# Patient Record
Sex: MALE | Race: OTHER | HISPANIC OR LATINO | Employment: PART TIME | URBAN - METROPOLITAN AREA
[De-identification: names, ages, dates, MRNs, and addresses within clinical notes are randomized per-mention and may not be internally consistent; named-entity substitution may affect disease eponyms.]

---

## 2022-08-03 ENCOUNTER — HOSPITAL ENCOUNTER (OUTPATIENT)
Dept: RADIOLOGY | Facility: HOSPITAL | Age: 47
Discharge: HOME | End: 2022-08-03
Attending: PHYSICAL MEDICINE & REHABILITATION
Payer: COMMERCIAL

## 2022-08-03 DIAGNOSIS — M48.061 SPINAL STENOSIS, LUMBAR REGION WITHOUT NEUROGENIC CLAUDICATION: ICD-10-CM

## 2022-08-03 DIAGNOSIS — M53.3 SACROCOCCYGEAL DISORDERS, NOT ELSEWHERE CLASSIFIED: ICD-10-CM

## 2022-08-03 PROCEDURE — 72110 X-RAY EXAM L-2 SPINE 4/>VWS: CPT

## 2022-08-03 PROCEDURE — 72202 X-RAY EXAM SI JOINTS 3/> VWS: CPT

## 2022-08-04 ENCOUNTER — HOSPITAL ENCOUNTER (OUTPATIENT)
Dept: RADIOLOGY | Facility: HOSPITAL | Age: 47
Discharge: HOME | End: 2022-08-04
Attending: PHYSICAL MEDICINE & REHABILITATION
Payer: COMMERCIAL

## 2022-08-04 DIAGNOSIS — M22.41 CHONDROMALACIA OF RIGHT PATELLA: ICD-10-CM

## 2022-08-04 PROCEDURE — 73562 X-RAY EXAM OF KNEE 3: CPT

## 2023-03-18 ENCOUNTER — HOSPITAL ENCOUNTER (EMERGENCY)
Facility: HOSPITAL | Age: 48
Discharge: HOME | End: 2023-03-18
Attending: INTERNAL MEDICINE

## 2023-03-18 VITALS
SYSTOLIC BLOOD PRESSURE: 128 MMHG | TEMPERATURE: 97.5 F | DIASTOLIC BLOOD PRESSURE: 71 MMHG | WEIGHT: 155.2 LBS | HEART RATE: 86 BPM | OXYGEN SATURATION: 99 % | RESPIRATION RATE: 16 BRPM

## 2023-03-18 DIAGNOSIS — H66.90 OTITIS MEDIA: Primary | ICD-10-CM

## 2023-03-18 DIAGNOSIS — H60.90 OTITIS EXTERNA: ICD-10-CM

## 2023-03-18 DIAGNOSIS — J32.9 SINUSITIS: ICD-10-CM

## 2023-03-18 DIAGNOSIS — N39.0 UTI (URINARY TRACT INFECTION): ICD-10-CM

## 2023-03-18 LAB
BACTERIA UR QL AUTO: ABNORMAL /HPF
BILIRUB UR QL STRIP: NEGATIVE
CLARITY UR: CLEAR
COLOR UR: ABNORMAL
GLUCOSE UR STRIP-MCNC: NEGATIVE MG/DL
HGB UR QL STRIP.AUTO: 25
KETONES UR STRIP-MCNC: NEGATIVE MG/DL
LEUKOCYTE ESTERASE UR QL STRIP: 25
MUCOUS THREADS UR QL AUTO: ABNORMAL
NITRITE UR QL STRIP: NEGATIVE
NON-SQ EPI CELLS URNS QL MICRO: ABNORMAL /HPF
PH UR STRIP.AUTO: 6 [PH]
PROT UR STRIP-MCNC: NEGATIVE MG/DL
RBC #/AREA URNS AUTO: ABNORMAL /HPF
SP GR UR STRIP.AUTO: 1.02 (ref 1–1.04)
UROBILINOGEN UA: NEGATIVE MG/DL
WBC #/AREA URNS AUTO: ABNORMAL /HPF

## 2023-03-18 RX ORDER — CEPHALEXIN 500 MG/1
500 CAPSULE ORAL EVERY 8 HOURS SCHEDULED
Qty: 30 CAPSULE | Refills: 0 | Status: SHIPPED | OUTPATIENT
Start: 2023-03-18 | End: 2023-03-28

## 2023-03-18 RX ORDER — CEPHALEXIN 500 MG/1
500 CAPSULE ORAL ONCE
Status: COMPLETED | OUTPATIENT
Start: 2023-03-18 | End: 2023-03-18

## 2023-03-18 RX ORDER — NEOMYCIN SULFATE, POLYMYXIN B SULFATE AND HYDROCORTISONE 10; 3.5; 1 MG/ML; MG/ML; [USP'U]/ML
4 SUSPENSION/ DROPS AURICULAR (OTIC) 4 TIMES DAILY
Qty: 10 ML | Refills: 0 | Status: SHIPPED | OUTPATIENT
Start: 2023-03-18

## 2023-03-18 RX ADMIN — CEPHALEXIN 500 MG: 500 CAPSULE ORAL at 10:57

## 2023-03-18 NOTE — ED PROVIDER NOTES
History  Chief Complaint   Patient presents with   • Earache     Bilat ear pain with right worse than left   • Painful Urination     Burning with urination along with foul odor  States he has had the same in the past     Pt with bilat ear pain and and nasal d/c  Pt also with burning with urine for several days, pt say urine smells bad, no abd pain no nausea no vomiting      Earache  Location:  Bilateral  Behind ear:  No abnormality  Quality:  Aching  Severity:  Moderate  Onset quality:  Gradual  Duration:  2 days  Progression:  Unchanged  Chronicity:  Recurrent  Context: not direct blow    Relieved by:  Nothing  Worsened by:  Nothing  Ineffective treatments:  None tried  Associated symptoms: congestion    Risk factors: no recent travel, no chronic ear infection and no prior ear surgery        None       Past Medical History:   Diagnosis Date   • Anxiety    • Depression        Past Surgical History:   Procedure Laterality Date   • EAR SURGERY Right    • EYE SURGERY Right    • PENIS SURGERY         History reviewed  No pertinent family history  I have reviewed and agree with the history as documented  E-Cigarette/Vaping   • E-Cigarette Use Never User      E-Cigarette/Vaping Substances     Social History     Tobacco Use   • Smoking status: Every Day     Packs/day: 1 00     Types: Cigarettes   • Smokeless tobacco: Never   Vaping Use   • Vaping Use: Never used   Substance Use Topics   • Alcohol use: Never   • Drug use: Yes     Types: Marijuana       Review of Systems   Constitutional: Negative  HENT: Positive for congestion, ear pain, sinus pressure and sinus pain  Eyes: Negative  Respiratory: Negative  Cardiovascular: Negative  Gastrointestinal: Negative  Endocrine: Negative  Genitourinary: Positive for dysuria  Musculoskeletal: Negative  Skin: Negative  Allergic/Immunologic: Negative  Neurological: Negative  Hematological: Negative  Psychiatric/Behavioral: Negative      All other systems reviewed and are negative  Physical Exam  Physical Exam  Vitals and nursing note reviewed  Constitutional:       Appearance: Normal appearance  He is normal weight  HENT:      Head: Normocephalic and atraumatic  Right Ear: External ear normal       Left Ear: Ear canal and external ear normal       Ears:      Comments: bilat tm erythema  Right ear canal erythema, no mastoid pain        Nose: Congestion and rhinorrhea present  Comments: Boggy mucosa left max sinus tender to palp      Mouth/Throat:      Mouth: Mucous membranes are moist       Pharynx: Oropharynx is clear  Eyes:      Extraocular Movements: Extraocular movements intact  Conjunctiva/sclera: Conjunctivae normal       Pupils: Pupils are equal, round, and reactive to light  Cardiovascular:      Rate and Rhythm: Normal rate and regular rhythm  Pulses: Normal pulses  Heart sounds: Normal heart sounds  Pulmonary:      Effort: Pulmonary effort is normal       Breath sounds: Normal breath sounds  Abdominal:      General: Abdomen is flat  Bowel sounds are normal       Palpations: Abdomen is soft  Musculoskeletal:         General: Normal range of motion  Cervical back: Normal range of motion and neck supple  Skin:     General: Skin is warm  Capillary Refill: Capillary refill takes less than 2 seconds  Neurological:      General: No focal deficit present  Mental Status: He is alert and oriented to person, place, and time     Psychiatric:         Mood and Affect: Mood normal          Vital Signs  ED Triage Vitals [03/18/23 0942]   Temperature Pulse Respirations Blood Pressure SpO2   97 5 °F (36 4 °C) 86 16 128/71 99 %      Temp Source Heart Rate Source Patient Position - Orthostatic VS BP Location FiO2 (%)   Oral Monitor Sitting Left arm --      Pain Score       --           Vitals:    03/18/23 0942   BP: 128/71   Pulse: 86   Patient Position - Orthostatic VS: Sitting         Visual Acuity      ED Medications  Medications   cephalexin (KEFLEX) capsule 500 mg (500 mg Oral Given 3/18/23 1057)       Diagnostic Studies  Results Reviewed     Procedure Component Value Units Date/Time    Urine Microscopic [[id number]]  (Abnormal) Collected: 03/18/23 1001    Lab Status: Final result Specimen: Urine, Clean Catch Updated: 03/18/23 1025     RBC, UA 2-4 /hpf      WBC, UA 0-5 /hpf      Epithelial Cells Occasional /hpf      Bacteria, UA Occasional /hpf      MUCUS THREADS Occasional    UA w Reflex to Microscopic w Reflex to Culture [[id number]]  (Abnormal) Collected: 03/18/23 1001    Lab Status: Final result Specimen: Urine, Clean Catch Updated: 03/18/23 1014     Color, UA Danyell     Clarity, UA Clear     Specific [place], UA 1 020     pH, UA 6 0     Leukocytes, UA 25 0     Nitrite, UA Negative     Protein, UA Negative mg/dl      Glucose, UA Negative mg/dl      Ketones, UA Negative mg/dl      Bilirubin, UA Negative     Occult Blood, UA 25 0     UROBILINOGEN UA Negative mg/dL     Chlamydia/GC amplified DNA by PCR [[id number]] Collected: 03/18/23 1007    Lab Status: In process Specimen: Urine, Other Updated: 03/18/23 1008                 No orders to display              Procedures  Procedures         ED Course                                             MDM    Disposition  Final diagnoses:   Otitis media   Sinusitis   UTI (urinary tract infection)   Otitis externa     Time reflects when diagnosis was documented in both MDM as applicable and the Disposition within this note     Time User Action Codes Description Comment    3/18/2023 10:46 AM Terell New  Add [H66 90] Otitis media     3/18/2023 10:46 AM Nilsa Hoang  Add [J32 9] Sinusitis     3/18/2023 10:46 AM Paolo Henry  Add [N39 0] UTI (urinary tract infection)     3/18/2023 10:48 AM Nilsa Hoang   Add [H60 90] Otitis externa       ED Disposition     ED Disposition   Discharge    Condition   Stable    Date/Time   Sat Mar 18, 2023 10:46 AM Comment   Marden Shoulder discharge to home/self care  Follow-up Information     Follow up With Specialties Details Why [address] [place] [address]  [phone]            Discharge Medication List as of 3/18/2023 10:49 AM      START taking these medications    Details   cephalexin (KEFLEX) 500 mg capsule Take 1 capsule (500 mg total) by mouth every 8 (eight) hours for 10 days, Starting Sat 3/18/2023, Until Tue 3/28/2023, Print      neomycin-polymyxin-hydrocortisone (CORTISPORIN) 0 35%-10,000 units/mL-1% otic suspension Administer 4 drops to the right ear 4 (four) times a day, Starting Sat 3/18/2023, Print             No discharge procedures on file      PDMP Review     None          ED Provider  Electronically Signed by           Coy Anders PA-C  03/18/23 0120

## 2023-03-20 LAB
C TRACH DNA SPEC QL NAA+PROBE: NEGATIVE
N GONORRHOEA DNA SPEC QL NAA+PROBE: NEGATIVE

## 2023-08-08 ENCOUNTER — HOSPITAL ENCOUNTER (EMERGENCY)
Facility: HOSPITAL | Age: 48
Discharge: HOME | End: 2023-08-09
Attending: EMERGENCY MEDICINE
Payer: COMMERCIAL

## 2023-08-08 ENCOUNTER — APPOINTMENT (EMERGENCY)
Dept: RADIOLOGY | Facility: HOSPITAL | Age: 48
End: 2023-08-08
Payer: COMMERCIAL

## 2023-08-08 VITALS
HEART RATE: 95 BPM | SYSTOLIC BLOOD PRESSURE: 138 MMHG | WEIGHT: 141.31 LBS | TEMPERATURE: 97.8 F | OXYGEN SATURATION: 99 % | RESPIRATION RATE: 18 BRPM | DIASTOLIC BLOOD PRESSURE: 91 MMHG

## 2023-08-08 DIAGNOSIS — S60.221A CONTUSION OF RIGHT HAND, INITIAL ENCOUNTER: Primary | ICD-10-CM

## 2023-08-08 PROCEDURE — 73110 X-RAY EXAM OF WRIST: CPT

## 2023-08-08 PROCEDURE — 73130 X-RAY EXAM OF HAND: CPT

## 2023-08-08 PROCEDURE — 99283 EMERGENCY DEPT VISIT LOW MDM: CPT

## 2023-08-08 PROCEDURE — 99284 EMERGENCY DEPT VISIT MOD MDM: CPT | Performed by: PHYSICIAN ASSISTANT

## 2023-08-08 NOTE — Clinical Note
Faith Orourke was seen and treated in our emergency department on 8/8/2023. No restrictions            Diagnosis:     Randy Lopez  may return to work on return date. He may return on this date: 08/13/2023         If you have any questions or concerns, please don't hesitate to call.       Bijan Stevenson PA-C    ______________________________           _______________          _______________  Hospital Representative                              Date                                Time

## 2023-08-09 RX ORDER — NAPROXEN 500 MG/1
500 TABLET ORAL 2 TIMES DAILY WITH MEALS
Qty: 30 TABLET | Refills: 0 | Status: SHIPPED | OUTPATIENT
Start: 2023-08-09 | End: 2023-08-17 | Stop reason: SDUPTHER

## 2023-08-09 RX ORDER — ACETAMINOPHEN 500 MG
500 TABLET ORAL EVERY 6 HOURS PRN
Qty: 30 TABLET | Refills: 0 | Status: SHIPPED | OUTPATIENT
Start: 2023-08-09
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
## 2023-08-11 ENCOUNTER — HOSPITAL ENCOUNTER (EMERGENCY)
Facility: HOSPITAL | Age: 48
Discharge: HOME | End: 2023-08-11
Attending: EMERGENCY MEDICINE | Admitting: EMERGENCY MEDICINE
Payer: COMMERCIAL

## 2023-08-11 ENCOUNTER — APPOINTMENT (EMERGENCY)
Dept: RADIOLOGY | Facility: HOSPITAL | Age: 48
End: 2023-08-11
Payer: COMMERCIAL

## 2023-08-11 VITALS
OXYGEN SATURATION: 98 % | WEIGHT: 141.54 LBS | HEART RATE: 84 BPM | RESPIRATION RATE: 17 BRPM | DIASTOLIC BLOOD PRESSURE: 68 MMHG | SYSTOLIC BLOOD PRESSURE: 134 MMHG | TEMPERATURE: 98.2 F

## 2023-08-11 DIAGNOSIS — M79.646 PAIN OF FINGER: ICD-10-CM

## 2023-08-11 DIAGNOSIS — M79.641 RIGHT HAND PAIN: Primary | ICD-10-CM

## 2023-08-11 PROCEDURE — 96372 THER/PROPH/DIAG INJ SC/IM: CPT

## 2023-08-11 PROCEDURE — 73140 X-RAY EXAM OF FINGER(S): CPT

## 2023-08-11 PROCEDURE — 99284 EMERGENCY DEPT VISIT MOD MDM: CPT | Performed by: PHYSICIAN ASSISTANT

## 2023-08-11 PROCEDURE — NC001 PR NO CHARGE: Performed by: PHYSICIAN ASSISTANT

## 2023-08-11 PROCEDURE — 73130 X-RAY EXAM OF HAND: CPT

## 2023-08-11 PROCEDURE — 99283 EMERGENCY DEPT VISIT LOW MDM: CPT

## 2023-08-11 PROCEDURE — 29125 APPL SHORT ARM SPLINT STATIC: CPT | Performed by: PHYSICIAN ASSISTANT

## 2023-08-11 RX ORDER — KETOROLAC TROMETHAMINE 30 MG/ML
15 INJECTION, SOLUTION INTRAMUSCULAR; INTRAVENOUS ONCE
Status: COMPLETED | OUTPATIENT
Start: 2023-08-11 | End: 2023-08-11

## 2023-08-11 RX ORDER — NAPROXEN 500 MG/1
500 TABLET ORAL 2 TIMES DAILY PRN
Qty: 20 TABLET | Refills: 0 | Status: SHIPPED | OUTPATIENT
Start: 2023-08-11

## 2023-08-11 RX ADMIN — KETOROLAC TROMETHAMINE 15 MG: 30 INJECTION, SOLUTION INTRAMUSCULAR; INTRAVENOUS at 13:32
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
## 2023-08-11 NOTE — Clinical Note
Brandon Robles was seen and treated in our emergency department on 8/11/2023. Diagnosis:     Adiel Appl  . He may return on this date: May return to work when cleared by hand specialist or by primary care physician/occupational medicine. If you have any questions or concerns, please don't hesitate to call.       Josephina Fabry, PA-C    ______________________________           _______________          _______________  Hospital Representative                              Date                                Time

## 2023-08-11 NOTE — DISCHARGE INSTRUCTIONS
Please refer to the attached information for strict return instructions. If symptoms worsen or new symptoms develop please return to the ER. Please follow up with hand specialist for re-evaluation of symptoms.

## 2023-08-11 NOTE — Clinical Note
Lisa Juarez was seen and treated in our emergency department on 8/11/2023. Diagnosis:     Nargis Julien  . He may return on this date: May return to work when cleared by hand specialist or by primary care physician/occupational medicine. If you have any questions or concerns, please don't hesitate to call.       Atif Whitaker PA-C    ______________________________           _______________          _______________  Hospital Representative                              Date                                Time

## 2023-08-14 ENCOUNTER — TELEPHONE (OUTPATIENT)
Age: 48
End: 2023-08-14

## 2023-08-14 NOTE — TELEPHONE ENCOUNTER
Patient is being referred to a orthopedics. Please schedule accordingly.     [address]   [phone]

## 2023-08-17 ENCOUNTER — OFFICE VISIT (OUTPATIENT)
Dept: OBGYN CLINIC | Facility: CLINIC | Age: 48
End: 2023-08-17
Payer: COMMERCIAL

## 2023-08-17 VITALS
HEIGHT: 66 IN | WEIGHT: 141 LBS | BODY MASS INDEX: 22.66 KG/M2 | DIASTOLIC BLOOD PRESSURE: 70 MMHG | SYSTOLIC BLOOD PRESSURE: 120 MMHG

## 2023-08-17 DIAGNOSIS — S60.221A CONTUSION OF RIGHT HAND, INITIAL ENCOUNTER: ICD-10-CM

## 2023-08-17 DIAGNOSIS — S60.221A CONTUSION OF RIGHT HAND, INITIAL ENCOUNTER: Primary | ICD-10-CM

## 2023-08-17 PROCEDURE — 99203 OFFICE O/P NEW LOW 30 MIN: CPT | Performed by: PHYSICIAN ASSISTANT

## 2023-08-17 RX ORDER — MELOXICAM 15 MG/1
15 TABLET ORAL DAILY
Qty: 30 TABLET | Refills: 0 | Status: SHIPPED | OUTPATIENT
Start: 2023-08-17 | End: 2023-08-17

## 2023-08-17 RX ORDER — MELOXICAM 15 MG/1
15 TABLET ORAL DAILY
Qty: 30 TABLET | Refills: 0 | Status: SHIPPED | OUTPATIENT
Start: 2023-08-17

## 2023-08-17 RX ORDER — NAPROXEN 500 MG/1
500 TABLET ORAL 2 TIMES DAILY WITH MEALS
Qty: 60 TABLET | Refills: 0 | Status: SHIPPED | OUTPATIENT
Start: 2023-08-17

## 2023-08-17 RX ORDER — GABAPENTIN 800 MG/1
800 TABLET ORAL 3 TIMES DAILY
COMMUNITY
Start: 2023-07-10
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
## 2023-08-17 NOTE — LETTER
August 17, 2023     Patient: Retta Curling  YOB: 1975  Date of Visit: 8/17/2023      To Whom it May Concern:    Retta Curling is under my professional care. Nahomi Adair was seen in my office on 8/17/2023. Nahomi Adair is out of work until his next evaluation in four weeks. If you have any questions or concerns, please don't hesitate to call.          Sincerely,          Fabian Davison PA-C

## 2023-12-11 ENCOUNTER — HOSPITAL ENCOUNTER (EMERGENCY)
Facility: HOSPITAL | Age: 48
Discharge: HOME | End: 2023-12-11
Attending: INTERNAL MEDICINE
Payer: COMMERCIAL

## 2023-12-11 VITALS
DIASTOLIC BLOOD PRESSURE: 59 MMHG | OXYGEN SATURATION: 96 % | TEMPERATURE: 97.7 F | RESPIRATION RATE: 16 BRPM | BODY MASS INDEX: 25.92 KG/M2 | HEART RATE: 79 BPM | WEIGHT: 160.6 LBS | SYSTOLIC BLOOD PRESSURE: 106 MMHG

## 2023-12-11 DIAGNOSIS — B34.9 VIRAL SYNDROME: Primary | ICD-10-CM

## 2023-12-11 PROCEDURE — 99282 EMERGENCY DEPT VISIT SF MDM: CPT

## 2023-12-11 PROCEDURE — 99283 EMERGENCY DEPT VISIT LOW MDM: CPT | Performed by: INTERNAL MEDICINE

## 2023-12-11 RX ORDER — ONDANSETRON 4 MG/1
4 TABLET, ORALLY DISINTEGRATING ORAL EVERY 6 HOURS PRN
Qty: 20 TABLET | Refills: 0 | Status: SHIPPED | OUTPATIENT
Start: 2023-12-11

## 2023-12-11 RX ORDER — NAPROXEN 500 MG/1
500 TABLET ORAL 2 TIMES DAILY WITH MEALS
Qty: 30 TABLET | Refills: 0 | Status: SHIPPED | OUTPATIENT
Start: 2023-12-11

## 2023-12-11 RX ORDER — BENZONATATE 100 MG/1
100 CAPSULE ORAL EVERY 8 HOURS
Qty: 21 CAPSULE | Refills: 0 | Status: SHIPPED | OUTPATIENT
Start: 2023-12-11

## 2023-12-11 RX ORDER — FLUTICASONE PROPIONATE 50 MCG
1 SPRAY, SUSPENSION (ML) NASAL DAILY
Qty: 16 G | Refills: 0 | Status: SHIPPED | OUTPATIENT
Start: 2023-12-11

## 2023-12-11 NOTE — ED PROVIDER NOTES
HPI: Patient is a 48 y.o. male who presents with 3 days of congestion, fever, chills, cough, headache, sore throat, fatigue, myalgias, and nausea which the patient describes at moderate The patient has had contact with people with similar symptoms.  The patient has not taken any medication.    Allergies   Allergen Reactions    Sulfa Antibiotics Itching    Morphine Rash       Past Medical History:   Diagnosis Date    Anxiety     Depression       Past Surgical History:   Procedure Laterality Date    EAR SURGERY Right     EYE SURGERY Right     PENIS SURGERY       Social History     Tobacco Use    Smoking status: Every Day     Packs/day: 1.00     Types: Cigarettes    Smokeless tobacco: Never   Vaping Use    Vaping Use: Never used   Substance Use Topics    Alcohol use: Never    Drug use: Yes     Types: Marijuana       Nursing notes reviewed  Physical Exam:  ED Triage Vitals [12/11/23 0928]   Temperature Pulse Respirations Blood Pressure SpO2   97.7 °F (36.5 °C) 79 16 106/59 96 %      Temp Source Heart Rate Source Patient Position - Orthostatic VS BP Location FiO2 (%)   Tympanic Monitor Sitting Left arm --      Pain Score       --           ROS: Positive for congestion fever, chills, cough, headache, sore throat, fatigue, myalgias, and nausea , the remainder of a 10 organ system ROS was otherwise unremarkable.  PHYSICAL EXAM    Constitutional:  Well developed, no acute distress  HEENT:  Conjunctiva normal. Oropharynx moist, no posterior oropharyngeal erythema or edema, no tonsillar exudate  Respiratory:  No respiratory distress, clear to auscultation  Cardiovascular:  Normal rate  GI:  Soft, nondistended, nontender  :  No costovertebral angle tenderness   Musculoskeletal:  No edema, no tenderness, no deformities  Integument:  Well hydrated, no rash   Lymphatic:  No lymphadenopathy noted   Neurologic:  Alert & oriented x 3, normal motor function, no focal deficits noted   Psychiatric:  Speech and behavior appropriate        The patient is stable and has a history and physical exam consistent with a viral illness. COVID19 testing has not been performed.  I considered the patient's other medical conditions as applicable/noted above in my medical decision making.  The patient is stable upon discharge. The plan is for supportive care at home.    The patient (and any family present) verbalized understanding of the discharge instructions and warnings that would necessitate return to the Emergency Department.  All questions were answered prior to discharge.    Medications - No data to display  Final diagnoses:   Viral syndrome     Time reflects when diagnosis was documented in both MDM as applicable and the Disposition within this note       Time User Action Codes Description Comment    12/11/2023  9:41 AM Anjelica Dunn [B34.9] Viral syndrome           ED Disposition       ED Disposition   Discharge    Condition   Stable    Date/Time   Mon Dec 11, 2023  9:41 AM    Comment   Chaitanya Alexander discharge to home/self care.                   Follow-up Information    None       Patient's Medications   Discharge Prescriptions    BENZONATATE (TESSALON PERLES) 100 MG CAPSULE    Take 1 capsule (100 mg total) by mouth every 8 (eight) hours       Start Date: 12/11/2023End Date: --       Order Dose: 100 mg       Quantity: 21 capsule    Refills: 0    FLUTICASONE (FLONASE) 50 MCG/ACT NASAL SPRAY    1 spray into each nostril daily       Start Date: 12/11/2023End Date: --       Order Dose: 1 spray       Quantity: 16 g    Refills: 0    NAPROXEN (NAPROSYN) 500 MG TABLET    Take 1 tablet (500 mg total) by mouth 2 (two) times a day with meals       Start Date: 12/11/2023End Date: --       Order Dose: 500 mg       Quantity: 30 tablet    Refills: 0    ONDANSETRON (ZOFRAN-ODT) 4 MG DISINTEGRATING TABLET    Take 1 tablet (4 mg total) by mouth every 6 (six) hours as needed for nausea or vomiting       Start Date: 12/11/2023End Date: --       Order Dose: 4 mg        Quantity: 20 tablet    Refills: 0     No discharge procedures on file.    Electronically Signed by       Anjelica Dunn MD  12/11/23 0922

## 2023-12-11 NOTE — Clinical Note
Chaitanya Alexander was seen and treated in our emergency department on 12/11/2023.                Diagnosis:     Chaitanya  .    He may return on this date: 12/14/2023         If you have any questions or concerns, please don't hesitate to call.      Anjelica Dunn MD    ______________________________           _______________          _______________  Hospital Representative                              Date                                Time

## 2023-12-25 ENCOUNTER — HOSPITAL ENCOUNTER (EMERGENCY)
Facility: HOSPITAL | Age: 48
Discharge: HOME | End: 2023-12-25
Attending: EMERGENCY MEDICINE
Payer: COMMERCIAL

## 2023-12-25 ENCOUNTER — APPOINTMENT (EMERGENCY)
Dept: CT IMAGING | Facility: HOSPITAL | Age: 48
End: 2023-12-25
Payer: COMMERCIAL

## 2023-12-25 VITALS
DIASTOLIC BLOOD PRESSURE: 74 MMHG | RESPIRATION RATE: 18 BRPM | BODY MASS INDEX: 23.84 KG/M2 | TEMPERATURE: 98 F | OXYGEN SATURATION: 98 % | WEIGHT: 147.71 LBS | HEART RATE: 81 BPM | SYSTOLIC BLOOD PRESSURE: 124 MMHG

## 2023-12-25 DIAGNOSIS — R06.6 HICCUPS: ICD-10-CM

## 2023-12-25 DIAGNOSIS — R10.9 ABDOMINAL PAIN: Primary | ICD-10-CM

## 2023-12-25 DIAGNOSIS — B02.9 SHINGLES: ICD-10-CM

## 2023-12-25 DIAGNOSIS — K44.9 HIATAL HERNIA: ICD-10-CM

## 2023-12-25 DIAGNOSIS — K21.9 GERD (GASTROESOPHAGEAL REFLUX DISEASE): ICD-10-CM

## 2023-12-25 LAB
ALBUMIN SERPL BCP-MCNC: 3.9 G/DL (ref 3.5–5)
ALP SERPL-CCNC: 61 U/L (ref 34–104)
ALT SERPL W P-5'-P-CCNC: 9 U/L (ref 7–52)
ANION GAP SERPL CALCULATED.3IONS-SCNC: 5 MMOL/L
ANISOCYTOSIS BLD QL SMEAR: PRESENT
AST SERPL W P-5'-P-CCNC: 13 U/L (ref 13–39)
BASOPHILS # BLD MANUAL: 0 THOUSAND/UL (ref 0–0.1)
BASOPHILS NFR MAR MANUAL: 0 % (ref 0–1)
BILIRUB SERPL-MCNC: 0.51 MG/DL (ref 0.2–1)
BUN SERPL-MCNC: 13 MG/DL (ref 5–25)
CALCIUM SERPL-MCNC: 8.6 MG/DL (ref 8.4–10.2)
CHLORIDE SERPL-SCNC: 106 MMOL/L (ref 96–108)
CO2 SERPL-SCNC: 27 MMOL/L (ref 21–32)
CREAT SERPL-MCNC: 1.05 MG/DL (ref 0.6–1.3)
DIFFERENTIAL COMMENT: ABNORMAL
EOSINOPHIL # BLD MANUAL: 0 THOUSAND/UL (ref 0–0.4)
EOSINOPHIL NFR BLD MANUAL: 0 % (ref 0–6)
ERYTHROCYTE [DISTWIDTH] IN BLOOD BY AUTOMATED COUNT: 12.6 % (ref 11.6–15.1)
GFR SERPL CREATININE-BSD FRML MDRD: 82 ML/MIN/1.73SQ M
GLUCOSE SERPL-MCNC: 98 MG/DL (ref 65–140)
HCT VFR BLD AUTO: 40.2 % (ref 36.5–49.3)
HGB BLD-MCNC: 13.8 G/DL (ref 12–17)
LG PLATELETS BLD QL SMEAR: PRESENT
LIPASE SERPL-CCNC: 39 U/L (ref 11–82)
LYMPHOCYTES # BLD AUTO: 1.77 THOUSAND/UL (ref 0.6–4.47)
LYMPHOCYTES # BLD AUTO: 8 % (ref 14–44)
MACROCYTES BLD QL AUTO: PRESENT
MCH RBC QN AUTO: 29.7 PG (ref 26.8–34.3)
MCHC RBC AUTO-ENTMCNC: 34.3 G/DL (ref 31.4–37.4)
MCV RBC AUTO: 87 FL (ref 82–98)
MICROCYTES BLD QL AUTO: PRESENT
MONOCYTES # BLD AUTO: 0.59 THOUSAND/UL (ref 0–1.22)
MONOCYTES NFR BLD: 7 % (ref 4–12)
NEUTROPHILS # BLD MANUAL: 6.07 THOUSAND/UL (ref 1.85–7.62)
NEUTS BAND NFR BLD MANUAL: 3 % (ref 0–8)
NEUTS SEG NFR BLD AUTO: 69 % (ref 43–75)
OVALOCYTES BLD QL SMEAR: PRESENT
PATHOLOGY REVIEW: YES
PLATELET # BLD AUTO: 265 THOUSANDS/UL (ref 149–390)
PLATELET BLD QL SMEAR: ADEQUATE
PMV BLD AUTO: 9.4 FL (ref 8.9–12.7)
POIKILOCYTOSIS BLD QL SMEAR: PRESENT
POTASSIUM SERPL-SCNC: 3.9 MMOL/L (ref 3.5–5.3)
PROT SERPL-MCNC: 6.2 G/DL (ref 6.4–8.4)
RBC # BLD AUTO: 4.65 MILLION/UL (ref 3.88–5.62)
RBC MORPH BLD: PRESENT
SODIUM SERPL-SCNC: 138 MMOL/L (ref 135–147)
TOXIC GRANULES BLD QL SMEAR: PRESENT
VARIANT LYMPHS # BLD AUTO: 13 %
WBC # BLD AUTO: 8.43 THOUSAND/UL (ref 4.31–10.16)

## 2023-12-25 PROCEDURE — 74177 CT ABD & PELVIS W/CONTRAST: CPT

## 2023-12-25 PROCEDURE — 93005 ELECTROCARDIOGRAM TRACING: CPT

## 2023-12-25 PROCEDURE — 96375 TX/PRO/DX INJ NEW DRUG ADDON: CPT

## 2023-12-25 PROCEDURE — 96372 THER/PROPH/DIAG INJ SC/IM: CPT

## 2023-12-25 PROCEDURE — 99285 EMERGENCY DEPT VISIT HI MDM: CPT | Performed by: EMERGENCY MEDICINE

## 2023-12-25 PROCEDURE — 85007 BL SMEAR W/DIFF WBC COUNT: CPT | Performed by: EMERGENCY MEDICINE

## 2023-12-25 PROCEDURE — 85027 COMPLETE CBC AUTOMATED: CPT | Performed by: EMERGENCY MEDICINE

## 2023-12-25 PROCEDURE — 96374 THER/PROPH/DIAG INJ IV PUSH: CPT

## 2023-12-25 PROCEDURE — G1004 CDSM NDSC: HCPCS

## 2023-12-25 PROCEDURE — 80053 COMPREHEN METABOLIC PANEL: CPT | Performed by: EMERGENCY MEDICINE

## 2023-12-25 PROCEDURE — 99284 EMERGENCY DEPT VISIT MOD MDM: CPT

## 2023-12-25 PROCEDURE — 83690 ASSAY OF LIPASE: CPT | Performed by: EMERGENCY MEDICINE

## 2023-12-25 PROCEDURE — C9113 INJ PANTOPRAZOLE SODIUM, VIA: HCPCS | Performed by: EMERGENCY MEDICINE

## 2023-12-25 PROCEDURE — 36415 COLL VENOUS BLD VENIPUNCTURE: CPT | Performed by: EMERGENCY MEDICINE

## 2023-12-25 RX ORDER — ACYCLOVIR 800 MG/1
800 TABLET ORAL 4 TIMES DAILY
Qty: 20 TABLET | Refills: 0 | Status: SHIPPED | OUTPATIENT
Start: 2023-12-25 | End: 2023-12-30

## 2023-12-25 RX ORDER — ONDANSETRON 2 MG/ML
4 INJECTION INTRAMUSCULAR; INTRAVENOUS ONCE
Status: COMPLETED | OUTPATIENT
Start: 2023-12-25 | End: 2023-12-25

## 2023-12-25 RX ORDER — PANTOPRAZOLE SODIUM 40 MG/1
40 TABLET, DELAYED RELEASE ORAL DAILY
Qty: 30 TABLET | Refills: 0 | Status: SHIPPED | OUTPATIENT
Start: 2023-12-25

## 2023-12-25 RX ORDER — PANTOPRAZOLE SODIUM 40 MG/10ML
40 INJECTION, POWDER, LYOPHILIZED, FOR SOLUTION INTRAVENOUS ONCE
Status: COMPLETED | OUTPATIENT
Start: 2023-12-25 | End: 2023-12-25

## 2023-12-25 RX ORDER — METOCLOPRAMIDE HYDROCHLORIDE 5 MG/ML
10 INJECTION INTRAMUSCULAR; INTRAVENOUS ONCE
Status: COMPLETED | OUTPATIENT
Start: 2023-12-25 | End: 2023-12-25

## 2023-12-25 RX ORDER — HALOPERIDOL 5 MG/ML
2 INJECTION INTRAMUSCULAR ONCE
Status: COMPLETED | OUTPATIENT
Start: 2023-12-25 | End: 2023-12-25

## 2023-12-25 RX ADMIN — IOHEXOL 100 ML: 350 INJECTION, SOLUTION INTRAVENOUS at 18:48

## 2023-12-25 RX ADMIN — HALOPERIDOL LACTATE 2 MG: 5 INJECTION, SOLUTION INTRAMUSCULAR at 18:23

## 2023-12-25 RX ADMIN — PANTOPRAZOLE SODIUM 40 MG: 40 INJECTION, POWDER, FOR SOLUTION INTRAVENOUS at 18:19

## 2023-12-25 RX ADMIN — ONDANSETRON 4 MG: 2 INJECTION INTRAMUSCULAR; INTRAVENOUS at 18:16

## 2023-12-25 RX ADMIN — METOCLOPRAMIDE 10 MG: 5 INJECTION, SOLUTION INTRAMUSCULAR; INTRAVENOUS at 19:33

## 2023-12-26 LAB
ATRIAL RATE: 66 BPM
P AXIS: 53 DEGREES
PR INTERVAL: 130 MS
QRS AXIS: 34 DEGREES
QRSD INTERVAL: 86 MS
QT INTERVAL: 398 MS
QTC INTERVAL: 417 MS
T WAVE AXIS: 67 DEGREES
VENTRICULAR RATE: 66 BPM

## 2023-12-26 NOTE — ED PROVIDER NOTES
History  Chief Complaint   Patient presents with    Abdominal Pain     States hiccups for 3 weeks. Complaining of vomiting blood and L sided abd pain.       History provided by:  Patient   used: No    Abdominal Pain  Pain location:  Epigastric  Pain quality: aching    Pain radiates to:  Does not radiate  Pain severity:  Moderate  Onset quality:  Gradual  Duration:  1 day  Timing:  Intermittent  Progression:  Waxing and waning  Chronicity:  New  Context: eating    Relieved by:  Nothing  Worsened by:  Nothing  Ineffective treatments:  None tried  Associated symptoms: no chest pain, no chills, no cough, no diarrhea, no dysuria, no fever, no nausea, no shortness of breath, no sore throat and no vomiting    Associated symptoms comment:  Hiccups  Risk factors comment:  Anxiety, Depression, Marijuana use      Prior to Admission Medications   Prescriptions Last Dose Informant Patient Reported? Taking?   acetaminophen (TYLENOL) 500 mg tablet   No No   Sig: Take 1 tablet (500 mg total) by mouth every 6 (six) hours as needed for moderate pain   Patient not taking: Reported on 2023   benzonatate (TESSALON PERLES) 100 mg capsule   No No   Sig: Take 1 capsule (100 mg total) by mouth every 8 (eight) hours   fluticasone (FLONASE) 50 mcg/act nasal spray   No No   Si spray into each nostril daily   gabapentin (NEURONTIN) 800 mg tablet   Yes No   Sig: Take 800 mg by mouth 3 (three) times a day   meloxicam (MOBIC) 15 mg tablet   No No   Sig: TAKE 1 TABLET (15 MG TOTAL) BY MOUTH DAILY.   naproxen (NAPROSYN) 500 mg tablet   No No   Sig: Take 1 tablet (500 mg total) by mouth 2 (two) times a day as needed for mild pain or moderate pain   Patient not taking: Reported on 2023   naproxen (Naprosyn) 500 mg tablet   No No   Sig: Take 1 tablet (500 mg total) by mouth 2 (two) times a day with meals   naproxen (Naprosyn) 500 mg tablet   No No   Sig: Take 1 tablet (500 mg total) by mouth 2 (two) times a day with  meals   neomycin-polymyxin-hydrocortisone (CORTISPORIN) 0.35%-10,000 units/mL-1% otic suspension   No No   Sig: Administer 4 drops to the right ear 4 (four) times a day   Patient not taking: Reported on 8/17/2023   ondansetron (ZOFRAN-ODT) 4 mg disintegrating tablet   No No   Sig: Take 1 tablet (4 mg total) by mouth every 6 (six) hours as needed for nausea or vomiting      Facility-Administered Medications: None       Past Medical History:   Diagnosis Date    Anxiety     Depression        Past Surgical History:   Procedure Laterality Date    EAR SURGERY Right     EYE SURGERY Right     PENIS SURGERY         History reviewed. No pertinent family history.  I have reviewed and agree with the history as documented.    E-Cigarette/Vaping    E-Cigarette Use Never User      E-Cigarette/Vaping Substances     Social History     Tobacco Use    Smoking status: Every Day     Current packs/day: 1.00     Types: Cigarettes    Smokeless tobacco: Never   Vaping Use    Vaping status: Never Used   Substance Use Topics    Alcohol use: Never    Drug use: Yes     Types: Marijuana       Review of Systems   Constitutional:  Negative for chills and fever.   HENT:  Negative for facial swelling, sore throat and trouble swallowing.    Eyes:  Negative for pain and visual disturbance.   Respiratory:  Negative for cough, chest tightness and shortness of breath.    Cardiovascular:  Negative for chest pain and leg swelling.   Gastrointestinal:  Positive for abdominal pain. Negative for diarrhea, nausea and vomiting.   Genitourinary:  Negative for dysuria and flank pain.   Musculoskeletal:  Negative for back pain, neck pain and neck stiffness.   Skin:  Negative for pallor and rash.   Allergic/Immunologic: Negative for environmental allergies and immunocompromised state.   Neurological:  Negative for dizziness and headaches.   Hematological:  Negative for adenopathy. Does not bruise/bleed easily.   Psychiatric/Behavioral:  Negative for agitation and  behavioral problems.    All other systems reviewed and are negative.      Physical Exam  Physical Exam  Vitals and nursing note reviewed.   Constitutional:       General: He is not in acute distress.     Appearance: He is well-developed.   HENT:      Head: Normocephalic and atraumatic.   Eyes:      Extraocular Movements: Extraocular movements intact.   Cardiovascular:      Rate and Rhythm: Normal rate and regular rhythm.      Heart sounds: Normal heart sounds.   Pulmonary:      Effort: Pulmonary effort is normal.      Breath sounds: Normal breath sounds.   Abdominal:      Palpations: Abdomen is soft.      Tenderness: There is abdominal tenderness in the epigastric area. There is no guarding or rebound.   Musculoskeletal:         General: Normal range of motion.      Cervical back: Normal range of motion and neck supple.   Skin:     General: Skin is warm and dry.      Findings: Rash present.      Comments: Vesicular rash involving the posterior inferior right buttock, extending into the right side of the scrotum   Neurological:      General: No focal deficit present.      Mental Status: He is alert and oriented to person, place, and time.   Psychiatric:         Mood and Affect: Mood normal.         Behavior: Behavior normal.         Vital Signs  ED Triage Vitals [12/25/23 1702]   Temperature Pulse Respirations Blood Pressure SpO2   98 °F (36.7 °C) 80 20 127/71 98 %      Temp Source Heart Rate Source Patient Position - Orthostatic VS BP Location FiO2 (%)   Oral Monitor Sitting Right arm --      Pain Score       10 - Worst Possible Pain           Vitals:    12/25/23 1702 12/25/23 1905 12/25/23 2113   BP: 127/71 126/73 124/74   Pulse: 80 83 81   Patient Position - Orthostatic VS: Sitting Lying Lying         Visual Acuity      ED Medications  Medications   ondansetron (ZOFRAN) injection 4 mg (4 mg Intravenous Given 12/25/23 1816)   haloperidol lactate (HALDOL) injection 2 mg (2 mg Intramuscular Given 12/25/23 1823)    pantoprazole (PROTONIX) injection 40 mg (40 mg Intravenous Given 12/25/23 1819)   iohexol (OMNIPAQUE) 350 MG/ML injection (SINGLE-DOSE) 100 mL (100 mL Intravenous Given 12/25/23 1848)   metoclopramide (REGLAN) injection 10 mg (10 mg Intravenous Given 12/25/23 1933)       Diagnostic Studies  Results Reviewed       Procedure Component Value Units Date/Time    Manual Differential(PHLEBS Do Not Order) [[id number]]  (Abnormal) Collected: 12/25/23 1727    Lab Status: Final result Specimen: Blood from Arm, Right Updated: 12/25/23 1853     Segmented % 69 %      Bands % 3 %      Lymphocytes % 8 %      Monocytes % 7 %      Eosinophils, % 0 %      Basophils % 0 %      Atypical Lymphocytes % 13 %      Absolute Neutrophils 6.07 Thousand/uL      Lymphocytes Absolute 1.77 Thousand/uL      Monocytes Absolute 0.59 Thousand/uL      Eosinophils Absolute 0.00 Thousand/uL      Basophils Absolute 0.00 Thousand/uL      Total Counted --     Toxic Granulation Present     RBC Morphology Present     Platelet Estimate Adequate     Large Platelet Present     Pathology Review Yes     Differential Comment see note     Anisocytosis Present     Macrocytes Present     Microcytes Present     Ovalocytes Present     Poikilocytes Present    Path Slide Review [[id number]] Collected: 12/25/23 1727    Lab Status: In process Specimen: Blood from Arm, Right Updated: 12/25/23 1821    Comprehensive metabolic panel [[id number]]  (Abnormal) Collected: 12/25/23 1727    Lab Status: Final result Specimen: Blood from Arm, Right Updated: 12/25/23 1800     Sodium 138 mmol/L      Potassium 3.9 mmol/L      Chloride 106 mmol/L      CO2 27 mmol/L      ANION GAP 5 mmol/L      BUN 13 mg/dL      Creatinine 1.05 mg/dL      Glucose 98 mg/dL      Calcium 8.6 mg/dL      AST 13 U/L      ALT 9 U/L      Alkaline Phosphatase 61 U/L      Total Protein 6.2 g/dL      Albumin 3.9 g/dL      Total Bilirubin 0.51 mg/dL      eGFR 82 ml/min/1.73sq m     Narrative:      National Kidney Disease  Foundation guidelines for Chronic Kidney Disease (CKD):     Stage 1 with normal or high GFR (GFR > 90 mL/min/1.73 square meters)    Stage 2 Mild CKD (GFR = 60-89 mL/min/1.73 square meters)    Stage 3A Moderate CKD (GFR = 45-59 mL/min/1.73 square meters)    Stage 3B Moderate CKD (GFR = 30-44 mL/min/1.73 square meters)    Stage 4 Severe CKD (GFR = 15-29 mL/min/1.73 square meters)    Stage 5 End Stage CKD (GFR <15 mL/min/1.73 square meters)  Note: GFR calculation is accurate only with a steady state creatinine    Lipase [[id number]]  (Normal) Collected: 12/25/23 1727    Lab Status: Final result Specimen: Blood from Arm, Right Updated: 12/25/23 1800     Lipase 39 u/L     CBC and differential [[id number]]  (Normal) Collected: 12/25/23 1727    Lab Status: Final result Specimen: Blood from Arm, Right Updated: 12/25/23 1743     WBC 8.43 Thousand/uL      RBC 4.65 Million/uL      Hemoglobin 13.8 g/dL      Hematocrit 40.2 %      MCV 87 fL      MCH 29.7 pg      MCHC 34.3 g/dL      RDW 12.6 %      MPV 9.4 fL      Platelets 265 Thousands/uL                    CT abdomen pelvis with contrast   Final Result by Osvaldo Zhao MD (12/25 2008)   1. No acute findings. Mild constipation.   2. Cholelithiasis.   3.  Small hiatal hernia with circumferential thickening of the distal esophagus may be secondary to gastroesophageal reflux disease.            Workstation performed: [id number]                    Procedures  ECG 12 Lead Documentation Only    Date/Time: 12/25/2023 9:31 PM    Performed by: Edson Ledbetter MD  Authorized by: Edson Ledbetter MD    Indications / Diagnosis:  Epigastric pain  ECG reviewed by me, the ED Provider: yes    Patient location:  ED  Interpretation:     Interpretation: normal    Rate:     ECG rate assessment: normal    Rhythm:     Rhythm: sinus rhythm    Ectopy:     Ectopy: none    QRS:     QRS axis:  Normal  Conduction:     Conduction: normal    ST segments:     ST segments:  Normal  T waves:     T waves: normal              ED Course  ED Course as of 12/25/23 2132   Mon Dec 25, 2023   1930 WBC: 8.43   1930 Hemoglobin: 13.8   1930 Platelet Count: 265   1930 Sodium: 138   1930 Potassium: 3.9   1930 BUN: 13   1930 Creatinine: 1.05   1930 Glucose, Random: 98   1930 Lipase: 39  Labs reviewed.   2022 CT abdomen pelvis with contrast  IMPRESSION:  1. No acute findings. Mild constipation.  2. Cholelithiasis.  3.  Small hiatal hernia with circumferential thickening of the distal esophagus may be secondary to gastroesophageal reflux disease.     2124 Patient informed about the CT results, gallstones, hiatal hernia, possible distal esophagitis, GERD, will send to pantoprazole prescription to the pharmacy; will also treat for shingles with antiviral                               SBIRT 22yo+      Flowsheet Row Most Recent Value   Initial Alcohol Screen: US AUDIT-C     1. How often do you have a drink containing alcohol? 0 Filed at: 12/25/2023 1732   2. How many drinks containing alcohol do you have on a typical day you are drinking?  0 Filed at: 12/25/2023 1732   3a. Male UNDER 65: How often do you have five or more drinks on one occasion? 0 Filed at: 12/25/2023 1732   3b. FEMALE Any Age, or MALE 65+: How often do you have 4 or more drinks on one occassion? 0 Filed at: 12/25/2023 1732   Audit-C Score 0 Filed at: 12/25/2023 1732   KALEB: How many times in the past year have you...    Used an illegal drug or used a prescription medication for non-medical reasons? Never Filed at: 12/25/2023 1732                      Medical Decision Making  Patient is a 48-year-old male, comes in with complaints of abdominal pain, hiccups, rash over the buttock, patient states that he has been followed up as outpatient and will be needing an EGD to look for possible gastric ulcer, patient states that he vomited some blood also, denies fever, diarrhea, cough, congestion; patient also notices a rash in the right side of his buttock, going down his scrotum.  On exam,  patient is conscious, alert, vital signs stable, no acute distress, abdomen is soft, tenderness in epigastrium and left side of the abdomen is noted, circular rash noted over the right buttock posterior inferiorly, going into the right side of the scrotum.  Impression: Gastritis, pancreatitis, colitis, shingles rash, will check labs, get CT scan, get EKG because of epigastric pain radiating into chest sometimes going on for a while, give acyclovir.    Problems Addressed:  Abdominal pain: acute illness or injury  GERD (gastroesophageal reflux disease): acute illness or injury  Hiatal hernia: acute illness or injury  Hiccups: acute illness or injury  Shingles: acute illness or injury     Details: Right side of Buttock and scrotum    Amount and/or Complexity of Data Reviewed  Labs: ordered. Decision-making details documented in ED Course.  Radiology: ordered. Decision-making details documented in ED Course.  ECG/medicine tests: ordered and independent interpretation performed. Decision-making details documented in ED Course.    Risk  Prescription drug management.             Disposition  Final diagnoses:   Abdominal pain   Hiccups   Shingles   Hiatal hernia   GERD (gastroesophageal reflux disease)     Time reflects when diagnosis was documented in both MDM as applicable and the Disposition within this note       Time User Action Codes Description Comment    12/25/2023  7:16 PM Alam, Edson Add [R10.9] Abdominal pain     12/25/2023  7:16 PM Alam, Edson Add [R06.6] Hiccups     12/25/2023  7:17 PM Alam, Edson Add [B02.9] Shingles     12/25/2023  8:23 PM Alam, Edson Add [K44.9] Hiatal hernia     12/25/2023  8:23 PM Alam, Edson Add [K21.9] GERD (gastroesophageal reflux disease)           ED Disposition       ED Disposition   Discharge    Condition   Stable    Date/Time   Mon Dec 25, 2023 2122    Comment   Chaitanya Alexander discharge to home/self care.                   Follow-up Information       Follow up With  Specialties Details Why Contact Info Additional Information    [facility] Schedule an appointment as soon as possible for a visit   [address], [address]  [facility] [address] [phone] [facility] [place], [address], [address], [place], [place], [address] [phone]    [facility] [place] [facility]   [address]  [address]  [facility] [address] [phone] [facility] [place], [address], [place], [place], [address] [phone]            Patient's Medications   Discharge Prescriptions    ACYCLOVIR (ZOVIRAX) 800 MG TABLET    Take 1 tablet (800 mg total) by mouth 4 (four) times a day for 5 days       Start Date: 12/25/2023End Date: 12/30/2023       Order Dose: 800 mg       Quantity: 20 tablet    Refills: 0    PANTOPRAZOLE (PROTONIX) 40 MG TABLET    Take 1 tablet (40 mg total) by mouth daily       Start Date: 12/25/2023End Date: --       Order Dose: 40 mg       Quantity: 30 tablet    Refills: 0       No discharge procedures on file.    PDMP Review       None            ED Provider  Electronically Signed by             Edson Ledbetter MD  12/25/23 5158

## 2024-08-05 ENCOUNTER — APPOINTMENT (EMERGENCY)
Dept: MRI IMAGING | Facility: HOSPITAL | Age: 49
End: 2024-08-05
Payer: COMMERCIAL

## 2024-08-05 ENCOUNTER — HOSPITAL ENCOUNTER (EMERGENCY)
Facility: HOSPITAL | Age: 49
Discharge: HOME | End: 2024-08-05
Attending: EMERGENCY MEDICINE
Payer: COMMERCIAL

## 2024-08-05 VITALS
SYSTOLIC BLOOD PRESSURE: 113 MMHG | DIASTOLIC BLOOD PRESSURE: 60 MMHG | OXYGEN SATURATION: 98 % | RESPIRATION RATE: 18 BRPM | TEMPERATURE: 97.6 F | HEART RATE: 61 BPM

## 2024-08-05 DIAGNOSIS — M54.50 LOWER BACK PAIN: Primary | ICD-10-CM

## 2024-08-05 LAB
ALBUMIN SERPL BCG-MCNC: 4 G/DL (ref 3.5–5)
ALP SERPL-CCNC: 62 U/L (ref 34–104)
ALT SERPL W P-5'-P-CCNC: 15 U/L (ref 7–52)
ANION GAP SERPL CALCULATED.3IONS-SCNC: 4 MMOL/L (ref 4–13)
AST SERPL W P-5'-P-CCNC: 16 U/L (ref 13–39)
BASOPHILS # BLD AUTO: 0.04 THOUSANDS/ÂΜL (ref 0–0.1)
BASOPHILS NFR BLD AUTO: 1 % (ref 0–1)
BILIRUB SERPL-MCNC: 0.5 MG/DL (ref 0.2–1)
BILIRUB UR QL STRIP: NEGATIVE
BUN SERPL-MCNC: 17 MG/DL (ref 5–25)
CALCIUM SERPL-MCNC: 8.7 MG/DL (ref 8.4–10.2)
CHLORIDE SERPL-SCNC: 107 MMOL/L (ref 96–108)
CLARITY UR: CLEAR
CO2 SERPL-SCNC: 28 MMOL/L (ref 21–32)
COLOR UR: YELLOW
CREAT SERPL-MCNC: 1.15 MG/DL (ref 0.6–1.3)
EOSINOPHIL # BLD AUTO: 0.15 THOUSAND/ÂΜL (ref 0–0.61)
EOSINOPHIL NFR BLD AUTO: 2 % (ref 0–6)
ERYTHROCYTE [DISTWIDTH] IN BLOOD BY AUTOMATED COUNT: 12.9 % (ref 11.6–15.1)
GFR SERPL CREATININE-BSD FRML MDRD: 73 ML/MIN/1.73SQ M
GLUCOSE SERPL-MCNC: 87 MG/DL (ref 65–140)
GLUCOSE UR STRIP-MCNC: NEGATIVE MG/DL
HCT VFR BLD AUTO: 46.5 % (ref 36.5–49.3)
HGB BLD-MCNC: 15.8 G/DL (ref 12–17)
HGB UR QL STRIP.AUTO: NEGATIVE
IMM GRANULOCYTES # BLD AUTO: 0.03 THOUSAND/UL (ref 0–0.2)
IMM GRANULOCYTES NFR BLD AUTO: 0 % (ref 0–2)
KETONES UR STRIP-MCNC: NEGATIVE MG/DL
LEUKOCYTE ESTERASE UR QL STRIP: NEGATIVE
LYMPHOCYTES # BLD AUTO: 1.69 THOUSANDS/ÂΜL (ref 0.6–4.47)
LYMPHOCYTES NFR BLD AUTO: 21 % (ref 14–44)
MCH RBC QN AUTO: 30.1 PG (ref 26.8–34.3)
MCHC RBC AUTO-ENTMCNC: 34 G/DL (ref 31.4–37.4)
MCV RBC AUTO: 89 FL (ref 82–98)
MONOCYTES # BLD AUTO: 0.57 THOUSAND/ÂΜL (ref 0.17–1.22)
MONOCYTES NFR BLD AUTO: 7 % (ref 4–12)
NEUTROPHILS # BLD AUTO: 5.71 THOUSANDS/ÂΜL (ref 1.85–7.62)
NEUTS SEG NFR BLD AUTO: 69 % (ref 43–75)
NITRITE UR QL STRIP: NEGATIVE
NRBC BLD AUTO-RTO: 0 /100 WBCS
PH UR STRIP.AUTO: 7.5 [PH] (ref 4.5–8)
PLATELET # BLD AUTO: 267 THOUSANDS/UL (ref 149–390)
PMV BLD AUTO: 9.7 FL (ref 8.9–12.7)
POTASSIUM SERPL-SCNC: 4 MMOL/L (ref 3.5–5.3)
PROT SERPL-MCNC: 6.7 G/DL (ref 6.4–8.4)
PROT UR STRIP-MCNC: NEGATIVE MG/DL
RBC # BLD AUTO: 5.25 MILLION/UL (ref 3.88–5.62)
SODIUM SERPL-SCNC: 139 MMOL/L (ref 135–147)
SP GR UR STRIP.AUTO: 1.02 (ref 1–1.03)
UROBILINOGEN UR QL STRIP.AUTO: 1 E.U./DL
WBC # BLD AUTO: 8.19 THOUSAND/UL (ref 4.31–10.16)

## 2024-08-05 PROCEDURE — 85025 COMPLETE CBC W/AUTO DIFF WBC: CPT | Performed by: EMERGENCY MEDICINE

## 2024-08-05 PROCEDURE — 80053 COMPREHEN METABOLIC PANEL: CPT | Performed by: EMERGENCY MEDICINE

## 2024-08-05 PROCEDURE — 81003 URINALYSIS AUTO W/O SCOPE: CPT

## 2024-08-05 PROCEDURE — 36415 COLL VENOUS BLD VENIPUNCTURE: CPT | Performed by: EMERGENCY MEDICINE

## 2024-08-05 PROCEDURE — 99283 EMERGENCY DEPT VISIT LOW MDM: CPT

## 2024-08-05 PROCEDURE — 99284 EMERGENCY DEPT VISIT MOD MDM: CPT | Performed by: EMERGENCY MEDICINE

## 2024-08-05 RX ORDER — NAPROXEN 500 MG/1
500 TABLET ORAL 2 TIMES DAILY WITH MEALS
Qty: 20 TABLET | Refills: 0 | Status: SHIPPED | OUTPATIENT
Start: 2024-08-05

## 2024-08-05 RX ORDER — DIAZEPAM 5 MG/ML
5 INJECTION, SOLUTION INTRAMUSCULAR; INTRAVENOUS ONCE
Status: DISCONTINUED | OUTPATIENT
Start: 2024-08-05 | End: 2024-08-05 | Stop reason: HOSPADM

## 2024-08-05 RX ORDER — DIAZEPAM 5 MG/1
5 TABLET ORAL EVERY 12 HOURS PRN
Qty: 20 TABLET | Refills: 0 | Status: SHIPPED | OUTPATIENT
Start: 2024-08-05 | End: 2024-08-15

## 2024-08-05 NOTE — ED PROVIDER NOTES
History  Chief Complaint   Patient presents with    Urinary Incontinence     Urinary incontinence worsening over the past 5 months. Increased back pain. Sent by PCP for further eval.      48-year-old male with urinary and stool incontinence over the past 5 months.  He has a history of chronic low back pain however he feels like it is getting worse over these past few months and now has numbness to his left heel and is incontinent of urine and stool at times.  His PCP sent him in for further workup.  No fevers, no nausea/vomiting, no abdominal pain        Prior to Admission Medications   Prescriptions Last Dose Informant Patient Reported? Taking?   acetaminophen (TYLENOL) 500 mg tablet   No No   Sig: Take 1 tablet (500 mg total) by mouth every 6 (six) hours as needed for moderate pain   Patient not taking: Reported on 2023   acyclovir (ZOVIRAX) 800 mg tablet   No No   Sig: Take 1 tablet (800 mg total) by mouth 4 (four) times a day for 5 days   benzonatate (TESSALON PERLES) 100 mg capsule   No No   Sig: Take 1 capsule (100 mg total) by mouth every 8 (eight) hours   fluticasone (FLONASE) 50 mcg/act nasal spray   No No   Si spray into each nostril daily   gabapentin (NEURONTIN) 800 mg tablet   Yes No   Sig: Take 800 mg by mouth 3 (three) times a day   meloxicam (MOBIC) 15 mg tablet   No No   Sig: TAKE 1 TABLET (15 MG TOTAL) BY MOUTH DAILY.   naproxen (NAPROSYN) 500 mg tablet   No No   Sig: Take 1 tablet (500 mg total) by mouth 2 (two) times a day as needed for mild pain or moderate pain   Patient not taking: Reported on 2023   naproxen (Naprosyn) 500 mg tablet   No No   Sig: Take 1 tablet (500 mg total) by mouth 2 (two) times a day with meals   naproxen (Naprosyn) 500 mg tablet   No No   Sig: Take 1 tablet (500 mg total) by mouth 2 (two) times a day with meals   neomycin-polymyxin-hydrocortisone (CORTISPORIN) 0.35%-10,000 units/mL-1% otic suspension   No No   Sig: Administer 4 drops to the right ear 4  (four) times a day   Patient not taking: Reported on 8/17/2023   ondansetron (ZOFRAN-ODT) 4 mg disintegrating tablet   No No   Sig: Take 1 tablet (4 mg total) by mouth every 6 (six) hours as needed for nausea or vomiting   pantoprazole (PROTONIX) 40 mg tablet   No No   Sig: Take 1 tablet (40 mg total) by mouth daily      Facility-Administered Medications: None       Past Medical History:   Diagnosis Date    Anxiety     Depression        Past Surgical History:   Procedure Laterality Date    EAR SURGERY Right     EYE SURGERY Right     PENIS SURGERY         History reviewed. No pertinent family history.  I have reviewed and agree with the history as documented.    E-Cigarette/Vaping    E-Cigarette Use Never User      E-Cigarette/Vaping Substances     Social History     Tobacco Use    Smoking status: Every Day     Current packs/day: 1.00     Types: Cigarettes    Smokeless tobacco: Never   Vaping Use    Vaping status: Never Used   Substance Use Topics    Alcohol use: Never    Drug use: Yes     Types: Marijuana       Review of Systems   Constitutional:  Negative for appetite change, fatigue and fever.   HENT:  Negative for rhinorrhea and sore throat.    Eyes:  Negative for pain.   Respiratory:  Negative for cough, shortness of breath and wheezing.    Cardiovascular:  Negative for chest pain and leg swelling.   Gastrointestinal:  Negative for abdominal pain, diarrhea and vomiting.   Genitourinary:  Negative for dysuria and flank pain.   Musculoskeletal:  Positive for back pain. Negative for neck pain.   Skin:  Negative for rash.   Neurological:  Positive for numbness. Negative for syncope and headaches.   Psychiatric/Behavioral:          Mood normal       Physical Exam  Physical Exam  Vitals and nursing note reviewed.   Constitutional:       Appearance: He is well-developed.   HENT:      Head: Normocephalic and atraumatic.      Right Ear: External ear normal.      Left Ear: External ear normal.   Eyes:      General: No  scleral icterus.     Extraocular Movements: Extraocular movements intact.   Cardiovascular:      Rate and Rhythm: Normal rate and regular rhythm.   Pulmonary:      Effort: Pulmonary effort is normal. No respiratory distress.      Breath sounds: Normal breath sounds.   Abdominal:      Palpations: Abdomen is soft.      Tenderness: There is no abdominal tenderness.   Musculoskeletal:      Cervical back: Normal range of motion and neck supple.      Comments: Low lumbar tenderness, +st. Leg raising test on the L, +n/v intact except for some decreased sensation of L heel   Skin:     General: Skin is warm and dry.      Coloration: Skin is not jaundiced or pale.   Neurological:      General: No focal deficit present.      Mental Status: He is alert and oriented to person, place, and time.   Psychiatric:         Mood and Affect: Mood normal.         Behavior: Behavior normal.         Vital Signs  ED Triage Vitals   Temperature Pulse Respirations Blood Pressure SpO2   08/05/24 1020 08/05/24 1020 08/05/24 1020 08/05/24 1020 08/05/24 1020   97.6 °F (36.4 °C) 78 18 115/70 98 %      Temp src Heart Rate Source Patient Position - Orthostatic VS BP Location FiO2 (%)   -- 08/05/24 1102 08/05/24 1102 08/05/24 1102 --    Monitor Sitting Right arm       Pain Score       08/05/24 1102       10 - Worst Possible Pain           Vitals:    08/05/24 1020 08/05/24 1102 08/05/24 1240   BP: 115/70 111/65 113/60   Pulse: 78 65 61   Patient Position - Orthostatic VS:  Sitting          Visual Acuity  Visual Acuity      Flowsheet Row Most Recent Value   L Pupil Size (mm) 4   R Pupil Size (mm) 4            ED Medications  Medications - No data to display      Diagnostic Studies  Results Reviewed       Procedure Component Value Units Date/Time    Comprehensive metabolic panel [[id number]] Collected: 08/05/24 1049    Lab Status: Final result Specimen: Blood from Arm, Left Updated: 08/05/24 1131     Sodium 139 mmol/L      Potassium 4.0 mmol/L       Chloride 107 mmol/L      CO2 28 mmol/L      ANION GAP 4 mmol/L      BUN 17 mg/dL      Creatinine 1.15 mg/dL      Glucose 87 mg/dL      Calcium 8.7 mg/dL      AST 16 U/L      ALT 15 U/L      Alkaline Phosphatase 62 U/L      Total Protein 6.7 g/dL      Albumin 4.0 g/dL      Total Bilirubin 0.50 mg/dL      eGFR 73 ml/min/1.73sq m     Narrative:      National Kidney Disease Foundation guidelines for Chronic Kidney Disease (CKD):     Stage 1 with normal or high GFR (GFR > 90 mL/min/1.73 square meters)    Stage 2 Mild CKD (GFR = 60-89 mL/min/1.73 square meters)    Stage 3A Moderate CKD (GFR = 45-59 mL/min/1.73 square meters)    Stage 3B Moderate CKD (GFR = 30-44 mL/min/1.73 square meters)    Stage 4 Severe CKD (GFR = 15-29 mL/min/1.73 square meters)    Stage 5 End Stage CKD (GFR <15 mL/min/1.73 square meters)  Note: GFR calculation is accurate only with a steady state creatinine    CBC and differential [[id number]] Collected: 08/05/24 1049    Lab Status: Final result Specimen: Blood from Arm, Left Updated: 08/05/24 1112     WBC 8.19 Thousand/uL      RBC 5.25 Million/uL      Hemoglobin 15.8 g/dL      Hematocrit 46.5 %      MCV 89 fL      MCH 30.1 pg      MCHC 34.0 g/dL      RDW 12.9 %      MPV 9.7 fL      Platelets 267 Thousands/uL      nRBC 0 /100 WBCs      Segmented % 69 %      Immature Grans % 0 %      Lymphocytes % 21 %      Monocytes % 7 %      Eosinophils Relative 2 %      Basophils Relative 1 %      Absolute Neutrophils 5.71 Thousands/µL      Absolute Immature Grans 0.03 Thousand/uL      Absolute Lymphocytes 1.69 Thousands/µL      Absolute Monocytes 0.57 Thousand/µL      Eosinophils Absolute 0.15 Thousand/µL      Basophils Absolute 0.04 Thousands/µL     Urine Macroscopic, POC [[id number]] Collected: 08/05/24 1031    Lab Status: Final result Specimen: Urine Updated: 08/05/24 1032     Color, UA Yellow     Clarity, UA Clear     pH, UA 7.5     Leukocytes, UA Negative     Nitrite, UA Negative     Protein, UA Negative mg/dl       Glucose, UA Negative mg/dl      Ketones, UA Negative mg/dl      Urobilinogen, UA 1.0 E.U./dl      Bilirubin, UA Negative     Occult Blood, UA Negative     Specific Gravity, UA 1.025    Narrative:      CLINITEK RESULT                   No orders to display              Procedures  Procedures         ED Course                                               Medical Decision Making  Patient's arrival to the ER I got the radiologist approval to get an emergent MRI cauda equina syndrome.  However his symptoms have been going on for about 5 months and it has a urinary urgency more than incontinence of urine    1:10pm patient is refusing MRI.  He states that he needs open MRI and wants to leave.  For low back pain and have him follow-up with specialist.  He states that he can see his PCP who can write for an open MRI prescription.  Patient is refusing the MRI here.  He understands that we might be missing A diagnosis that he would have to be admitted for.  He states that he does not want this MRI here and wants to go home and get his as an outpatient    Amount and/or Complexity of Data Reviewed  Labs: ordered.  Radiology: ordered.    Risk  Prescription drug management.    He understands that we can be missing             Disposition  Final diagnoses:   Lower back pain     Time reflects when diagnosis was documented in both MDM as applicable and the Disposition within this note       Time User Action Codes Description Comment    8/5/2024  1:09 PM Pat Isabel Add [M54.50] Lower back pain           ED Disposition       ED Disposition   Discharge    Condition   Stable    Date/Time   Mon Aug 5, 2024  1:09 PM    Comment   Chaitanya Alexander discharge to home/self care.                   Follow-up Information       Follow up With Specialties Details Why Contact Info Additional Information    [facility]   [address] [facility] [address] [phone] [facility] [place], [address], [address], [place], [place], [address] [phone]    [facility]   [address]  [address]  [facility] [address] [phone] [facility] [place], [address], [address], [place], [place], [address] [phone]            Discharge Medication List as of 8/5/2024  1:11 PM        START taking these medications    Details   diazepam (VALIUM) 5 mg tablet Take 1 tablet (5 mg total) by mouth every 12 (twelve) hours as needed for muscle spasms for up to 10 days, Starting Mon 8/5/2024, Until Thu 8/15/2024 at 2359, Normal      !! naproxen (NAPROSYN) 500 mg tablet Take 1 tablet (500 mg total) by mouth 2 (two) times a day with meals, Starting Mon 8/5/2024, Normal       !! - Potential duplicate medications found. Please discuss with provider.        CONTINUE these medications which have NOT CHANGED    Details   acetaminophen (TYLENOL) 500 mg tablet Take 1 tablet (500 mg total) by mouth every 6 (six) hours as needed for moderate pain, Starting Wed 8/9/2023, Normal      acyclovir (ZOVIRAX) 800 mg tablet Take 1 tablet (800 mg total) by mouth 4 (four) times a day for 5 days, Starting Mon 12/25/2023, Until Sat 12/30/2023, Normal      benzonatate (TESSALON PERLES) 100 mg capsule Take 1 capsule (100 mg total) by mouth every 8 (eight) hours, Starting Mon 12/11/2023, Print      fluticasone (FLONASE) 50 mcg/act nasal spray 1 spray into each nostril daily, Starting Mon 12/11/2023, Print      gabapentin (NEURONTIN) 800 mg tablet Take 800 mg by mouth 3 (three) times a day, Starting Mon 7/10/2023, Historical Med      meloxicam (MOBIC) 15 mg tablet TAKE 1 TABLET (15 MG TOTAL) BY MOUTH DAILY., Starting Thu 8/17/2023, Normal      !! naproxen (NAPROSYN) 500 mg tablet Take 1 tablet (500 mg total) by mouth 2 (two) times a day as needed for mild pain or moderate pain, Starting Fri 8/11/2023,  Normal      !! naproxen (Naprosyn) 500 mg tablet Take 1 tablet (500 mg total) by mouth 2 (two) times a day with meals, Starting Thu 8/17/2023, Normal      !! naproxen (Naprosyn) 500 mg tablet Take 1 tablet (500 mg total) by mouth 2 (two) times a day with meals, Starting Mon 12/11/2023, Print      neomycin-polymyxin-hydrocortisone (CORTISPORIN) 0.35%-10,000 units/mL-1% otic suspension Administer 4 drops to the right ear 4 (four) times a day, Starting Sat 3/18/2023, Print      ondansetron (ZOFRAN-ODT) 4 mg disintegrating tablet Take 1 tablet (4 mg total) by mouth every 6 (six) hours as needed for nausea or vomiting, Starting Mon 12/11/2023, Print      pantoprazole (PROTONIX) 40 mg tablet Take 1 tablet (40 mg total) by mouth daily, Starting Mon 12/25/2023, Normal       !! - Potential duplicate medications found. Please discuss with provider.          No discharge procedures on file.    PDMP Review       None            ED Provider  Electronically Signed by             Pat Roach MD  08/05/24 8077

## 2024-09-11 ENCOUNTER — HOSPITAL ENCOUNTER (EMERGENCY)
Facility: HOSPITAL | Age: 49
Discharge: HOME | DRG: 466 | End: 2024-09-11
Attending: EMERGENCY MEDICINE | Admitting: EMERGENCY MEDICINE
Payer: COMMERCIAL

## 2024-09-11 ENCOUNTER — APPOINTMENT (EMERGENCY)
Dept: CT IMAGING | Facility: HOSPITAL | Age: 49
DRG: 466 | End: 2024-09-11
Payer: COMMERCIAL

## 2024-09-11 VITALS
HEART RATE: 59 BPM | RESPIRATION RATE: 18 BRPM | OXYGEN SATURATION: 99 % | BODY MASS INDEX: 26.55 KG/M2 | SYSTOLIC BLOOD PRESSURE: 133 MMHG | DIASTOLIC BLOOD PRESSURE: 82 MMHG | WEIGHT: 164.46 LBS | TEMPERATURE: 99.9 F

## 2024-09-11 DIAGNOSIS — T83.9XXA PROBLEM WITH URINARY CATHETER (HCC): Primary | ICD-10-CM

## 2024-09-11 LAB
ANION GAP SERPL CALCULATED.3IONS-SCNC: 4 MMOL/L (ref 4–13)
BASOPHILS # BLD AUTO: 0.04 THOUSANDS/ΜL (ref 0–0.1)
BASOPHILS NFR BLD AUTO: 0 % (ref 0–1)
BUN SERPL-MCNC: 18 MG/DL (ref 5–25)
CALCIUM SERPL-MCNC: 8.7 MG/DL (ref 8.4–10.2)
CHLORIDE SERPL-SCNC: 107 MMOL/L (ref 96–108)
CO2 SERPL-SCNC: 28 MMOL/L (ref 21–32)
CREAT SERPL-MCNC: 1.29 MG/DL (ref 0.6–1.3)
EOSINOPHIL # BLD AUTO: 0.35 THOUSAND/ΜL (ref 0–0.61)
EOSINOPHIL NFR BLD AUTO: 3 % (ref 0–6)
ERYTHROCYTE [DISTWIDTH] IN BLOOD BY AUTOMATED COUNT: 12.4 % (ref 11.6–15.1)
GFR SERPL CREATININE-BSD FRML MDRD: 64 ML/MIN/1.73SQ M
GLUCOSE SERPL-MCNC: 105 MG/DL (ref 65–140)
HCT VFR BLD AUTO: 43.5 % (ref 36.5–49.3)
HGB BLD-MCNC: 14.7 G/DL (ref 12–17)
IMM GRANULOCYTES # BLD AUTO: 0.04 THOUSAND/UL (ref 0–0.2)
IMM GRANULOCYTES NFR BLD AUTO: 0 % (ref 0–2)
LYMPHOCYTES # BLD AUTO: 1.33 THOUSANDS/ΜL (ref 0.6–4.47)
LYMPHOCYTES NFR BLD AUTO: 11 % (ref 14–44)
MCH RBC QN AUTO: 30.6 PG (ref 26.8–34.3)
MCHC RBC AUTO-ENTMCNC: 33.8 G/DL (ref 31.4–37.4)
MCV RBC AUTO: 91 FL (ref 82–98)
MONOCYTES # BLD AUTO: 0.65 THOUSAND/ΜL (ref 0.17–1.22)
MONOCYTES NFR BLD AUTO: 5 % (ref 4–12)
NEUTROPHILS # BLD AUTO: 10.23 THOUSANDS/ΜL (ref 1.85–7.62)
NEUTS SEG NFR BLD AUTO: 81 % (ref 43–75)
NRBC BLD AUTO-RTO: 0 /100 WBCS
PLATELET # BLD AUTO: 259 THOUSANDS/UL (ref 149–390)
PMV BLD AUTO: 9.8 FL (ref 8.9–12.7)
POTASSIUM SERPL-SCNC: 4.4 MMOL/L (ref 3.5–5.3)
RBC # BLD AUTO: 4.8 MILLION/UL (ref 3.88–5.62)
SODIUM SERPL-SCNC: 139 MMOL/L (ref 135–147)
WBC # BLD AUTO: 12.64 THOUSAND/UL (ref 4.31–10.16)

## 2024-09-11 PROCEDURE — 96375 TX/PRO/DX INJ NEW DRUG ADDON: CPT

## 2024-09-11 PROCEDURE — 74177 CT ABD & PELVIS W/CONTRAST: CPT

## 2024-09-11 PROCEDURE — 96376 TX/PRO/DX INJ SAME DRUG ADON: CPT

## 2024-09-11 PROCEDURE — 99285 EMERGENCY DEPT VISIT HI MDM: CPT | Performed by: EMERGENCY MEDICINE

## 2024-09-11 PROCEDURE — 36415 COLL VENOUS BLD VENIPUNCTURE: CPT | Performed by: EMERGENCY MEDICINE

## 2024-09-11 PROCEDURE — 99283 EMERGENCY DEPT VISIT LOW MDM: CPT

## 2024-09-11 PROCEDURE — 85025 COMPLETE CBC W/AUTO DIFF WBC: CPT | Performed by: EMERGENCY MEDICINE

## 2024-09-11 PROCEDURE — 96374 THER/PROPH/DIAG INJ IV PUSH: CPT

## 2024-09-11 PROCEDURE — 80048 BASIC METABOLIC PNL TOTAL CA: CPT | Performed by: EMERGENCY MEDICINE

## 2024-09-11 RX ORDER — ONDANSETRON 2 MG/ML
4 INJECTION INTRAMUSCULAR; INTRAVENOUS ONCE
Status: COMPLETED | OUTPATIENT
Start: 2024-09-11 | End: 2024-09-11

## 2024-09-11 RX ORDER — HYDROMORPHONE HCL/PF 1 MG/ML
1 SYRINGE (ML) INJECTION ONCE
Status: COMPLETED | OUTPATIENT
Start: 2024-09-11 | End: 2024-09-11

## 2024-09-11 RX ADMIN — ONDANSETRON 4 MG: 2 INJECTION INTRAMUSCULAR; INTRAVENOUS at 12:17

## 2024-09-11 RX ADMIN — IOHEXOL 100 ML: 350 INJECTION, SOLUTION INTRAVENOUS at 13:03

## 2024-09-11 RX ADMIN — HYDROMORPHONE HYDROCHLORIDE 1 MG: 1 INJECTION, SOLUTION INTRAMUSCULAR; INTRAVENOUS; SUBCUTANEOUS at 12:17

## 2024-09-11 RX ADMIN — HYDROMORPHONE HYDROCHLORIDE 1 MG: 1 INJECTION, SOLUTION INTRAMUSCULAR; INTRAVENOUS; SUBCUTANEOUS at 13:53

## 2024-09-11 NOTE — ED PROVIDER NOTES
"1. Problem with urinary catheter (HCC)      ED Disposition       ED Disposition   Discharge    Condition   Stable    Date/Time   Wed Sep 11, 2024  2:50 PM    Comment   Chaitanya Alexander discharge to home/self care.                   Assessment & Plan       Medical Decision Making  48y M POD 5  stage 1 johanson urethroplasty cystoscopy,uretheral dilator ,and internal optically meatomy here for penile/perineal pain w/ concerns he had a latex catheter placed and he has a latex allergy.  Upon eval, pt does have an indwelling latex calvillo that was apparently placed postoperatively. Symptoms may be related to reaction to the latex, but given perineal pain will need to r/o postoperative infection / abscess. Will get cbc to r/o sig leukocytosis, anemia; will get CT a/p to r/o post operative infection, abscess, proctitis/prostatitis.  Will d/w [facility]    Problems Addressed:  Problem with urinary catheter (HCC): acute illness or injury that poses a threat to life or bodily functions     Details: Pt w/ indwelling latex catheter despite latex allergy placed by [facility].  No evidence of postoperative infection/bleeding at this time.    Will send to office for catheter exchange    Amount and/or Complexity of Data Reviewed  External Data Reviewed: notes.     Details: Reviewed [facility] surgical notes from 9/6  Labs: ordered. Decision-making details documented in ED Course.  Radiology: ordered.  Discussion of management or test interpretation with external provider(s): D/w Dr. Cast office  D/w Dr. Flores [facility] inpt uro    Risk  Prescription drug management.                  ED Course as of 09/11/24 1526   Wed Sep 11, 2024   1243 WBC(!): 12.64   1409 Left message w/ [facility] - attempted to speak w/ someone at the office and was told I needed to speak with the \"inpatient consult team\".  The office will have the team contact me back   4473 Spoke to Dr. Flores, [facility] Inpatient [facility]rology consult group. Pt can go directly to Dr." Segun office to have the calvillo changed out for a non-latex calvillo.  Office closes at 5pm    D/w pt - instructed to go directly to [facility] on [address] for calvillo exchange. Pt agrees to go        Medications   ondansetron (ZOFRAN) injection 4 mg (4 mg Intravenous Given 9/11/24 1217)   HYDROmorphone (DILAUDID) injection 1 mg (1 mg Intravenous Given 9/11/24 1217)   iohexol (OMNIPAQUE) 350 MG/ML injection (MULTI-DOSE) 100 mL (100 mL Intravenous Given 9/11/24 1303)   HYDROmorphone (DILAUDID) injection 1 mg (1 mg Intravenous Given 9/11/24 1353)       History of Present Illness       Patient presents with:  Urinary Catheter Problem: Pt states having catheter put in place 5 days ago. States painful burning x2 days. States has had UTI's but it feels different. Pt states is allergic to latex and isn't sure if they put the wrong catheter in          History provided by:  Patient   used: No        48y M POD 5 stage 1 johanson urethroplasty cystoscopy,uretheral dilator ,and internal optically meatomy for reported postprocedural urethral stricture by [facility], Dr. Santhosh Cast. Pt discharged w/ indwelling calvillo and has reported f/u on 9/25.  Pt reporting increased burning sensation in the perineum and the penis, increased when he sits down or when he has a bowel movement that started yesterday and is worse today.  Denies f/c/s, nauseated, no vomiting. Pt w/ known latex allergy and upon initial evaluation, pt is noted to have an indwelling latex catheter    Review of Systems   All other systems reviewed and are negative.          Objective     ED Triage Vitals   Temperature Pulse Blood Pressure Respirations SpO2 Patient Position - Orthostatic VS   09/11/24 1127 09/11/24 1127 09/11/24 1127 09/11/24 1127 09/11/24 1127 09/11/24 1127   99.9 °F (37.7 °C) 80 144/92 18 97 % Standing      Temp Source Heart Rate Source BP Location FiO2 (%) Pain Score    09/11/24 1127 09/11/24 1127 09/11/24 1127 -- 09/11/24  1135    Oral Monitor Right arm  (S) 10 - Worst Possible Pain        Physical Exam  Vitals and nursing note reviewed. Exam conducted with a chaperone present.   Constitutional:       General: He is not in acute distress.     Appearance: Normal appearance. He is not ill-appearing, toxic-appearing or diaphoretic.   Eyes:      Conjunctiva/sclera: Conjunctivae normal.   Cardiovascular:      Rate and Rhythm: Normal rate.   Pulmonary:      Effort: Pulmonary effort is normal.   Abdominal:      General: Bowel sounds are normal. There is no distension.      Palpations: Abdomen is soft.      Tenderness: There is abdominal tenderness (mild) in the suprapubic area.   Genitourinary:     Testes: Normal.      Comments: Indwelling latex calvillo w/ intact dressing to glans region  Musculoskeletal:      Cervical back: Normal range of motion.   Skin:     General: Skin is warm.      Findings: No erythema.   Neurological:      General: No focal deficit present.      Mental Status: He is alert.   Psychiatric:         Mood and Affect: Mood normal.         Labs Reviewed   CBC AND DIFFERENTIAL - Abnormal       Result Value    WBC 12.64 (*)     RBC 4.80      Hemoglobin 14.7      Hematocrit 43.5      MCV 91      MCH 30.6      MCHC 33.8      RDW 12.4      MPV 9.8      Platelets 259      nRBC 0      Segmented % 81 (*)     Immature Grans % 0      Lymphocytes % 11 (*)     Monocytes % 5      Eosinophils Relative 3      Basophils Relative 0      Absolute Neutrophils 10.23 (*)     Absolute Immature Grans 0.04      Absolute Lymphocytes 1.33      Absolute Monocytes 0.65      Eosinophils Absolute 0.35      Basophils Absolute 0.04     BASIC METABOLIC PANEL    Sodium 139      Potassium 4.4      Chloride 107      CO2 28      ANION GAP 4      BUN 18      Creatinine 1.29      Glucose 105      Calcium 8.7      eGFR 64      Narrative:     National Kidney Disease Foundation guidelines for Chronic Kidney Disease (CKD):     Stage 1 with normal or high GFR (GFR > 90  mL/min/1.73 square meters)    Stage 2 Mild CKD (GFR = 60-89 mL/min/1.73 square meters)    Stage 3A Moderate CKD (GFR = 45-59 mL/min/1.73 square meters)    Stage 3B Moderate CKD (GFR = 30-44 mL/min/1.73 square meters)    Stage 4 Severe CKD (GFR = 15-29 mL/min/1.73 square meters)    Stage 5 End Stage CKD (GFR <15 mL/min/1.73 square meters)  Note: GFR calculation is accurate only with a steady state creatinine     CT abdomen pelvis with contrast   ED Interpretation by Micaela Reese DO (09/11 5139)   See below      Final Interpretation by Marion Aguilar MD (09/11 6067)      No prostate abscess or obvious prostatitis identified. Rosado catheter terminating in the bladder. No obvious bladder wall thickening or inflammatory stranding noted.      Scrotum and testes appear grossly normal on CT.      No bowel wall thickening or bowel obstruction. Normal appendix. No intraperitoneal free air or ascites.               Workstation performed: [id number]             Procedures       Micaela Reese DO  09/11/24 4453

## 2024-09-13 ENCOUNTER — APPOINTMENT (EMERGENCY)
Dept: CT IMAGING | Facility: HOSPITAL | Age: 49
DRG: 466 | End: 2024-09-13
Payer: COMMERCIAL

## 2024-09-13 ENCOUNTER — HOSPITAL ENCOUNTER (INPATIENT)
Facility: HOSPITAL | Age: 49
LOS: 1 days | Discharge: LEFT BEFORE DISCHARGE | DRG: 466 | End: 2024-09-14
Attending: EMERGENCY MEDICINE | Admitting: INTERNAL MEDICINE
Payer: COMMERCIAL

## 2024-09-13 DIAGNOSIS — N39.0 UTI (URINARY TRACT INFECTION): Primary | ICD-10-CM

## 2024-09-13 DIAGNOSIS — A41.9 SEPSIS (HCC): ICD-10-CM

## 2024-09-13 PROBLEM — M54.9 BACK PAIN: Status: ACTIVE | Noted: 2024-09-13

## 2024-09-13 LAB
ALBUMIN SERPL BCG-MCNC: 4.2 G/DL (ref 3.5–5)
ALP SERPL-CCNC: 70 U/L (ref 34–104)
ALT SERPL W P-5'-P-CCNC: 19 U/L (ref 7–52)
ANION GAP SERPL CALCULATED.3IONS-SCNC: 8 MMOL/L (ref 4–13)
AST SERPL W P-5'-P-CCNC: 14 U/L (ref 13–39)
BASOPHILS # BLD MANUAL: 0 THOUSAND/UL (ref 0–0.1)
BASOPHILS NFR MAR MANUAL: 0 % (ref 0–1)
BILIRUB DIRECT SERPL-MCNC: 0.1 MG/DL (ref 0–0.2)
BILIRUB SERPL-MCNC: 0.55 MG/DL (ref 0.2–1)
BUN SERPL-MCNC: 18 MG/DL (ref 5–25)
CALCIUM SERPL-MCNC: 9 MG/DL (ref 8.4–10.2)
CARDIAC TROPONIN I PNL SERPL HS: <2 NG/L
CHLORIDE SERPL-SCNC: 106 MMOL/L (ref 96–108)
CO2 SERPL-SCNC: 24 MMOL/L (ref 21–32)
CREAT SERPL-MCNC: 1.2 MG/DL (ref 0.6–1.3)
EOSINOPHIL # BLD MANUAL: 0 THOUSAND/UL (ref 0–0.4)
EOSINOPHIL NFR BLD MANUAL: 0 % (ref 0–6)
ERYTHROCYTE [DISTWIDTH] IN BLOOD BY AUTOMATED COUNT: 12.6 % (ref 11.6–15.1)
GFR SERPL CREATININE-BSD FRML MDRD: 70 ML/MIN/1.73SQ M
GLUCOSE SERPL-MCNC: 116 MG/DL (ref 65–140)
HCT VFR BLD AUTO: 43.1 % (ref 36.5–49.3)
HGB BLD-MCNC: 14.9 G/DL (ref 12–17)
LACTATE SERPL-SCNC: 0.6 MMOL/L (ref 0.5–2)
LYMPHOCYTES # BLD AUTO: 0.38 THOUSAND/UL (ref 0.6–4.47)
LYMPHOCYTES # BLD AUTO: 2 % (ref 14–44)
MCH RBC QN AUTO: 31 PG (ref 26.8–34.3)
MCHC RBC AUTO-ENTMCNC: 34.6 G/DL (ref 31.4–37.4)
MCV RBC AUTO: 90 FL (ref 82–98)
MONOCYTES # BLD AUTO: 0.96 THOUSAND/UL (ref 0–1.22)
MONOCYTES NFR BLD: 5 % (ref 4–12)
NEUTROPHILS # BLD MANUAL: 17.88 THOUSAND/UL (ref 1.85–7.62)
NEUTS SEG NFR BLD AUTO: 93 % (ref 43–75)
PLATELET # BLD AUTO: 259 THOUSANDS/UL (ref 149–390)
PLATELET BLD QL SMEAR: ADEQUATE
PMV BLD AUTO: 10.2 FL (ref 8.9–12.7)
POTASSIUM SERPL-SCNC: 3.7 MMOL/L (ref 3.5–5.3)
PROCALCITONIN SERPL-MCNC: 0.09 NG/ML
PROT SERPL-MCNC: 7.1 G/DL (ref 6.4–8.4)
RBC # BLD AUTO: 4.8 MILLION/UL (ref 3.88–5.62)
RBC MORPH BLD: NORMAL
SODIUM SERPL-SCNC: 138 MMOL/L (ref 135–147)
WBC # BLD AUTO: 19.23 THOUSAND/UL (ref 4.31–10.16)

## 2024-09-13 PROCEDURE — 36415 COLL VENOUS BLD VENIPUNCTURE: CPT | Performed by: EMERGENCY MEDICINE

## 2024-09-13 PROCEDURE — 80048 BASIC METABOLIC PNL TOTAL CA: CPT | Performed by: EMERGENCY MEDICINE

## 2024-09-13 PROCEDURE — 80076 HEPATIC FUNCTION PANEL: CPT | Performed by: EMERGENCY MEDICINE

## 2024-09-13 PROCEDURE — 84145 PROCALCITONIN (PCT): CPT | Performed by: EMERGENCY MEDICINE

## 2024-09-13 PROCEDURE — 84484 ASSAY OF TROPONIN QUANT: CPT | Performed by: INTERNAL MEDICINE

## 2024-09-13 PROCEDURE — 83605 ASSAY OF LACTIC ACID: CPT | Performed by: EMERGENCY MEDICINE

## 2024-09-13 PROCEDURE — 85007 BL SMEAR W/DIFF WBC COUNT: CPT | Performed by: EMERGENCY MEDICINE

## 2024-09-13 PROCEDURE — 74177 CT ABD & PELVIS W/CONTRAST: CPT

## 2024-09-13 PROCEDURE — 87040 BLOOD CULTURE FOR BACTERIA: CPT | Performed by: EMERGENCY MEDICINE

## 2024-09-13 PROCEDURE — 85027 COMPLETE CBC AUTOMATED: CPT | Performed by: EMERGENCY MEDICINE

## 2024-09-13 PROCEDURE — 93005 ELECTROCARDIOGRAM TRACING: CPT

## 2024-09-13 PROCEDURE — 84484 ASSAY OF TROPONIN QUANT: CPT | Performed by: EMERGENCY MEDICINE

## 2024-09-13 PROCEDURE — 99223 1ST HOSP IP/OBS HIGH 75: CPT | Performed by: INTERNAL MEDICINE

## 2024-09-13 PROCEDURE — 87086 URINE CULTURE/COLONY COUNT: CPT | Performed by: EMERGENCY MEDICINE

## 2024-09-13 PROCEDURE — 99291 CRITICAL CARE FIRST HOUR: CPT | Performed by: EMERGENCY MEDICINE

## 2024-09-13 RX ORDER — ACETAMINOPHEN 325 MG/1
650 TABLET ORAL EVERY 8 HOURS PRN
Status: DISCONTINUED | OUTPATIENT
Start: 2024-09-13 | End: 2024-09-14 | Stop reason: HOSPADM

## 2024-09-13 RX ORDER — KETOROLAC TROMETHAMINE 30 MG/ML
15 INJECTION, SOLUTION INTRAMUSCULAR; INTRAVENOUS ONCE
Status: COMPLETED | OUTPATIENT
Start: 2024-09-13 | End: 2024-09-13

## 2024-09-13 RX ORDER — ONDANSETRON 2 MG/ML
4 INJECTION INTRAMUSCULAR; INTRAVENOUS ONCE
Status: COMPLETED | OUTPATIENT
Start: 2024-09-13 | End: 2024-09-13

## 2024-09-13 RX ORDER — GABAPENTIN 300 MG/1
300 CAPSULE ORAL 3 TIMES DAILY
Status: DISCONTINUED | OUTPATIENT
Start: 2024-09-13 | End: 2024-09-14 | Stop reason: HOSPADM

## 2024-09-13 RX ORDER — OXYBUTYNIN CHLORIDE 5 MG/1
5 TABLET ORAL 3 TIMES DAILY
Status: DISCONTINUED | OUTPATIENT
Start: 2024-09-13 | End: 2024-09-14 | Stop reason: HOSPADM

## 2024-09-13 RX ORDER — OXYCODONE HYDROCHLORIDE 5 MG/1
5 TABLET ORAL EVERY 6 HOURS PRN
Status: DISCONTINUED | OUTPATIENT
Start: 2024-09-13 | End: 2024-09-14 | Stop reason: HOSPADM

## 2024-09-13 RX ORDER — HYDROMORPHONE HCL/PF 1 MG/ML
0.5 SYRINGE (ML) INJECTION ONCE
Status: COMPLETED | OUTPATIENT
Start: 2024-09-13 | End: 2024-09-13

## 2024-09-13 RX ORDER — ENOXAPARIN SODIUM 100 MG/ML
40 INJECTION SUBCUTANEOUS DAILY
Status: DISCONTINUED | OUTPATIENT
Start: 2024-09-14 | End: 2024-09-14 | Stop reason: HOSPADM

## 2024-09-13 RX ORDER — SODIUM CHLORIDE 9 MG/ML
75 INJECTION, SOLUTION INTRAVENOUS CONTINUOUS
Status: DISCONTINUED | OUTPATIENT
Start: 2024-09-13 | End: 2024-09-14

## 2024-09-13 RX ADMIN — KETOROLAC TROMETHAMINE 15 MG: 30 INJECTION, SOLUTION INTRAMUSCULAR; INTRAVENOUS at 16:43

## 2024-09-13 RX ADMIN — SODIUM CHLORIDE 1000 ML: 0.9 INJECTION, SOLUTION INTRAVENOUS at 16:45

## 2024-09-13 RX ADMIN — HYDROMORPHONE HYDROCHLORIDE 0.5 MG: 1 INJECTION, SOLUTION INTRAMUSCULAR; INTRAVENOUS; SUBCUTANEOUS at 20:22

## 2024-09-13 RX ADMIN — OXYBUTYNIN CHLORIDE 5 MG: 5 TABLET ORAL at 20:22

## 2024-09-13 RX ADMIN — IOHEXOL 100 ML: 350 INJECTION, SOLUTION INTRAVENOUS at 16:35

## 2024-09-13 RX ADMIN — CEFEPIME 2000 MG: 2 INJECTION, POWDER, FOR SOLUTION INTRAVENOUS at 18:46

## 2024-09-13 RX ADMIN — SODIUM CHLORIDE 75 ML/HR: 0.9 INJECTION, SOLUTION INTRAVENOUS at 20:25

## 2024-09-13 RX ADMIN — ONDANSETRON 4 MG: 2 INJECTION INTRAMUSCULAR; INTRAVENOUS at 16:42

## 2024-09-13 RX ADMIN — GABAPENTIN 300 MG: 300 CAPSULE ORAL at 20:22

## 2024-09-13 NOTE — TREATMENT PLAN
Chaitanya Alexander  1975  [id number]  09/13/24           Urology ITreatment Plan        Chaitanya Alexander is a 48-year-old male known to Dr. Santhosh Cast, [facility] for history of urinary incontinence, phimosis and urethral stricture postoperative day 7 stage I Mary urethroplasty urethral dilatation and internal meatotomy.  Patient with history of latex allergy.  None silicone catheter placed intraoperatively.  Patient presented to [facility] with penile swelling and pain.  Emergency room providers contacted [facility] against background of recent instrumentation.  Rosado catheter was exchanged following morning at [facility].  Catheter scheduled for removal/T OV 9/25 at [facility].      Patient is presenting with fever and white count as well as fatigue.  CT is negative for  tract obstruction, bleed etc.        Vitals:    09/13/24 1730   BP: 111/59   Pulse: 83   Resp: 14   Temp:    SpO2: 97%               Lab Results   Component Value Date    WBC 19.23 (H) 09/13/2024    HGB 14.9 09/13/2024    HCT 43.1 09/13/2024    MCV 90 09/13/2024     09/13/2024     Lab Results   Component Value Date    SODIUM 138 09/13/2024    K 3.7 09/13/2024     09/13/2024    CO2 24 09/13/2024    BUN 18 09/13/2024    CREATININE 1.20 09/13/2024    GLUC 116 09/13/2024    CALCIUM 9.0 09/13/2024       Image Report      Procedure Component Value Units Date/Time   CT abdomen pelvis with contrast [[id number]] Collected: 09/13/24 1641   Order Status: Completed Updated: 09/13/24 1651   Narrative:     CT ABDOMEN AND PELVIS WITH IV CONTRAST    INDICATION: fever, lower abdominal pain, urinary sx. .    COMPARISON: 9/11/2024    TECHNIQUE: CT examination of the abdomen and pelvis was performed. Multiplanar 2D reformatted images were created from the source data.    This examination, like all CT scans performed in the [facility], was performed utilizing techniques to minimize radiation dose exposure, including  the use of iterative reconstruction and automated exposure control. Radiation dose length  product (DLP) for this visit: 380 mGy-cm    IV Contrast: 100 mL of iohexol (OMNIPAQUE)  Enteric Contrast: Not administered. Lack of oral contrast limits the sensitivity for pathology within the bowel.    FINDINGS:    ABDOMEN    LOWER CHEST: Small hiatal hernia. No other clinically significant abnormality in the visualized lower chest.    LIVER/BILIARY TREE: Unremarkable.    GALLBLADDER: No calcified gallstones. No pericholecystic inflammatory change.    SPLEEN: Unremarkable.    PANCREAS: Unremarkable.    ADRENAL GLANDS: Unremarkable.    KIDNEYS/URETERS: Unremarkable. No hydronephrosis.    STOMACH AND BOWEL: Unremarkable.    APPENDIX: No findings to suggest appendicitis.    ABDOMINOPELVIC CAVITY: No ascites. No pneumoperitoneum. No lymphadenopathy.    VESSELS: Unremarkable for patient's age.    PELVIS    REPRODUCTIVE ORGANS: Unremarkable for patient's age.    URINARY BLADDER: There is a Rosado catheter within the urinary bladder. The bladder is otherwise unremarkable and empty.    ABDOMINAL WALL/INGUINAL REGIONS: Unremarkable.    BONES: No acute fracture or suspicious osseous lesion.   Impression:       No acute findings in the abdomen or pelvis.  The urinary bladder is empty with an indwelling Rosado catheter.           Plan:    Admit to the internal medicine service for antibiosis.  No indication for acute urologic consultation or  tract manipulation.  Postoperative questions should be directed to surgical team of record at [facility].  Maintain Rosado catheter to straight drainage as postoperative requirement.  Do not remove.  Discussed with internal medicine, NAVIN Barnhart

## 2024-09-13 NOTE — ASSESSMENT & PLAN NOTE
1 week ago he had as surgery at [facility] he has a history of urinary incontinence, phimosis and urethral stricture, s/p urethroplasty urethral dilatation and internal meatomy  Patient has a history of latex allergy, Rosado catheter was placed per [facility]'s urology note none silicone catheter was placed intraoperatively  Patient came back to the ED 9/11 for burning and pain with Rosado.  ED discussed with [facility] urology, he went back to urology the same day and the catheter was exchanged he was given antibiotics  He presented to the hospital due to continued pain and burning  Found to have a fever, leukocytosis suspect secondary to UTI  CT abdomen pelvis with contrast showed no acute findings, urinary bladder is empty with indwelling Rosado catheter  Urine culture is pending  Blood cultures are pending  Started on cefepime, continue for now  Urology saw the patient, no plan to remove the catheter, he will follow-up with his primary urologist

## 2024-09-13 NOTE — ASSESSMENT & PLAN NOTE
Met sepsis criteria with fever, leukocytosis suspect secondary to UTI  IV fluids, blood cultures drawn, received IV antibiotics  Follow-up cultures, blood cultures and urine cultures  Continue cefepime  Continue IV fluids

## 2024-09-13 NOTE — H&P
H&P - Hospitalist   Name: Chaitanya Alexander 48 y.o. male I MRN: [id number]  Unit/Bed#: CELESTINO POOL I Date of Admission: 9/13/2024   Date of Service: 9/13/2024 I Hospital Day: 0     Assessment & Plan  UTI (urinary tract infection)  1 week ago he had as surgery at [facility] he has a history of urinary incontinence, phimosis and urethral stricture, s/p urethroplasty urethral dilatation and internal meatomy  Patient has a history of latex allergy, Rosado catheter was placed per [facility] urology note none silicone catheter was placed intraoperatively  Patient came back to the ED 9/11 for burning and pain with Rosado.  ED discussed with [facility] urology, he went back to urology the same day and the catheter was exchanged he was given antibiotics  He presented to the hospital due to continued pain and burning  Found to have a fever, leukocytosis suspect secondary to UTI  CT abdomen pelvis with contrast showed no acute findings, urinary bladder is empty with indwelling Rosado catheter  Urine culture is pending  Blood cultures are pending  Started on cefepime, continue for now  Urology saw the patient, no plan to remove the catheter, he will follow-up with his primary urologist  Sepsis ([facility])  Met sepsis criteria with fever, leukocytosis suspect secondary to UTI  IV fluids, blood cultures drawn, received IV antibiotics  Follow-up cultures, blood cultures and urine cultures  Continue cefepime  Continue IV fluids  Back pain  Continue gabapentin for chronic back pain    VTE Pharmacologic Prophylaxis: VTE Score: 3 Moderate Risk (Score 3-4) - Pharmacological DVT Prophylaxis Ordered: enoxaparin (Lovenox).  Code Status: Level 1 - Full Code   Discussion with family: Patient declined call to .     Anticipated Length of Stay: Patient will be admitted on an inpatient basis with an anticipated length of stay of greater than 2 midnights secondary to UTI.    History of Present Illness   Chief Complaint: Burning  urination, fever chills    Chaitanya Alexander is a 48 y.o. male with a PMH of back pain, using medical marijuana, who presents with burning with urination fever and chills.  Patient has a history of urinary incontinence, phimosis, ureteral stricture s/p urethroplasty atrial dilatation and internal myotomy about a week ago with [facility] urology.  Patient has a history of latex allergy, Rosado [facility] none silicone catheter was placed intraoperatively.  Patient came back to the ED 9/11 for burning and pain with Rosado.  ED discussed with [facility] urology, he went back to the urology office the same day and the catheter was exchanged, he was given antibiotics.  He presented to the hospital today due to continued pain and burning, fever or chills.  Admitted for UTI    Review of Systems   Constitutional:  Positive for chills and fever. Negative for activity change and appetite change.   HENT: Negative.     Respiratory:  Negative for shortness of breath.    Cardiovascular:  Negative for chest pain, palpitations and leg swelling.   Gastrointestinal:  Positive for nausea. Negative for abdominal pain, diarrhea and vomiting.   Genitourinary:  Positive for dysuria and penile pain.   Musculoskeletal: Negative.    Skin: Negative.    Neurological:  Negative for dizziness.   Hematological: Negative.    Psychiatric/Behavioral: Negative.         I have reviewed the patient's PMH, PSH, Social History, Family History, Meds, and Allergies  Social History:  Marital Status: Single     Objective     Vitals:   Blood Pressure: 106/58 (09/13/24 1830)  Pulse: 78 (09/13/24 1830)  Temperature: 98.9 °F (37.2 °C) (09/13/24 1830)  Temp Source: Oral (09/13/24 1830)  Respirations: 16 (09/13/24 1830)  Weight - Scale: 72.5 kg (159 lb 13.3 oz) (09/13/24 1548)  SpO2: 95 % (09/13/24 1830)    Physical Exam  Constitutional:       General: He is not in acute distress.  HENT:      Head: Atraumatic.   Cardiovascular:  "     Rate and Rhythm: Normal rate and regular rhythm.      Heart sounds: No murmur heard.  Pulmonary:      Effort: Pulmonary effort is normal. No respiratory distress.      Breath sounds: Normal breath sounds. No wheezing.   Abdominal:      General: Bowel sounds are normal. There is no distension.      Palpations: Abdomen is soft.      Tenderness: There is no abdominal tenderness.   Genitourinary:     Comments: Rosado catheter, urine clear and yellow  Musculoskeletal:         General: No swelling.      Cervical back: Neck supple.   Skin:     General: Skin is warm and dry.   Neurological:      General: No focal deficit present.      Mental Status: He is alert.   Psychiatric:         Mood and Affect: Mood normal.         Lines/Drains:  Lines/Drains/Airways       Active Status       None                        Additional Data:   Lab Results: I have reviewed the following results: CBC/BMP:   .     09/13/24  1619   WBC 19.23*   HGB 14.9   HCT 43.1      SODIUM 138   K 3.7      CO2 24   BUN 18   CREATININE 1.20   GLUC 116      Results from last 7 days   Lab Units 09/13/24  1619 09/11/24  1219   WBC Thousand/uL 19.23* 12.64*   HEMOGLOBIN g/dL 14.9 14.7   HEMATOCRIT % 43.1 43.5   PLATELETS Thousands/uL 259 259   SEGS PCT %  --  81*   LYMPHO PCT % 2* 11*   MONO PCT % 5 5   EOS PCT % 0 3     Results from last 7 days   Lab Units 09/13/24  1619   SODIUM mmol/L 138   POTASSIUM mmol/L 3.7   CHLORIDE mmol/L 106   CO2 mmol/L 24   BUN mg/dL 18   CREATININE mg/dL 1.20   ANION GAP mmol/L 8   CALCIUM mg/dL 9.0   ALBUMIN g/dL 4.2   TOTAL BILIRUBIN mg/dL 0.55   ALK PHOS U/L 70   ALT U/L 19   AST U/L 14   GLUCOSE RANDOM mg/dL 116             No results found for: \"HGBA1C\"  Results from last 7 days   Lab Units 09/13/24  1619   LACTIC ACID mmol/L 0.6   PROCALCITONIN ng/ml 0.09       Imaging Review: Reviewed radiology reports from this admission including: CT abdomen/pelvis.  Other Studies: No additional pertinent studies " reviewed.    Administrative Statements   I have spent a total time of 60 minutes in caring for this patient on the day of the visit/encounter including Impressions, Counseling / Coordination of care, Documenting in the medical record, Reviewing / ordering tests, medicine, procedures  , and Obtaining or reviewing history  .    ** Please Note: This note has been constructed using a voice recognition system. **

## 2024-09-14 VITALS
HEART RATE: 78 BPM | BODY MASS INDEX: 25.55 KG/M2 | HEIGHT: 66 IN | OXYGEN SATURATION: 96 % | DIASTOLIC BLOOD PRESSURE: 66 MMHG | TEMPERATURE: 98.1 F | WEIGHT: 159 LBS | SYSTOLIC BLOOD PRESSURE: 116 MMHG | RESPIRATION RATE: 19 BRPM

## 2024-09-14 PROBLEM — F41.8 MIXED ANXIETY AND DEPRESSIVE DISORDER: Status: ACTIVE | Noted: 2022-08-04

## 2024-09-14 PROBLEM — M54.16 RADICULOPATHY, LUMBAR REGION: Status: ACTIVE | Noted: 2023-03-15

## 2024-09-14 LAB
ANION GAP SERPL CALCULATED.3IONS-SCNC: 2 MMOL/L (ref 4–13)
ATRIAL RATE: 79 BPM
ATRIAL RATE: 92 BPM
BACTERIA UR CULT: NORMAL
BUN SERPL-MCNC: 16 MG/DL (ref 5–25)
CALCIUM SERPL-MCNC: 8.5 MG/DL (ref 8.4–10.2)
CHLORIDE SERPL-SCNC: 111 MMOL/L (ref 96–108)
CO2 SERPL-SCNC: 25 MMOL/L (ref 21–32)
CREAT SERPL-MCNC: 1.11 MG/DL (ref 0.6–1.3)
ERYTHROCYTE [DISTWIDTH] IN BLOOD BY AUTOMATED COUNT: 12.7 % (ref 11.6–15.1)
GFR SERPL CREATININE-BSD FRML MDRD: 77 ML/MIN/1.73SQ M
GLUCOSE SERPL-MCNC: 93 MG/DL (ref 65–140)
HCT VFR BLD AUTO: 40.3 % (ref 36.5–49.3)
HGB BLD-MCNC: 13.7 G/DL (ref 12–17)
MCH RBC QN AUTO: 31.1 PG (ref 26.8–34.3)
MCHC RBC AUTO-ENTMCNC: 34 G/DL (ref 31.4–37.4)
MCV RBC AUTO: 92 FL (ref 82–98)
P AXIS: 53 DEGREES
P AXIS: 58 DEGREES
PLATELET # BLD AUTO: 205 THOUSANDS/UL (ref 149–390)
PMV BLD AUTO: 9.7 FL (ref 8.9–12.7)
POTASSIUM SERPL-SCNC: 4.1 MMOL/L (ref 3.5–5.3)
PR INTERVAL: 126 MS
PR INTERVAL: 126 MS
QRS AXIS: 11 DEGREES
QRS AXIS: 3 DEGREES
QRSD INTERVAL: 88 MS
QRSD INTERVAL: 92 MS
QT INTERVAL: 344 MS
QT INTERVAL: 354 MS
QTC INTERVAL: 405 MS
QTC INTERVAL: 425 MS
RBC # BLD AUTO: 4.4 MILLION/UL (ref 3.88–5.62)
SODIUM SERPL-SCNC: 138 MMOL/L (ref 135–147)
T WAVE AXIS: 40 DEGREES
T WAVE AXIS: 44 DEGREES
VENTRICULAR RATE: 79 BPM
VENTRICULAR RATE: 92 BPM
WBC # BLD AUTO: 13.72 THOUSAND/UL (ref 4.31–10.16)

## 2024-09-14 PROCEDURE — NC001 PR NO CHARGE

## 2024-09-14 PROCEDURE — 85027 COMPLETE CBC AUTOMATED: CPT | Performed by: INTERNAL MEDICINE

## 2024-09-14 PROCEDURE — 93010 ELECTROCARDIOGRAM REPORT: CPT | Performed by: INTERNAL MEDICINE

## 2024-09-14 PROCEDURE — 99239 HOSP IP/OBS DSCHRG MGMT >30: CPT

## 2024-09-14 PROCEDURE — 80048 BASIC METABOLIC PNL TOTAL CA: CPT | Performed by: INTERNAL MEDICINE

## 2024-09-14 RX ORDER — CIPROFLOXACIN 500 MG/1
500 TABLET, FILM COATED ORAL EVERY 12 HOURS SCHEDULED
Qty: 12 TABLET | Refills: 0 | Status: SHIPPED | OUTPATIENT
Start: 2024-09-14 | End: 2024-09-20

## 2024-09-14 RX ADMIN — OXYBUTYNIN CHLORIDE 5 MG: 5 TABLET ORAL at 09:55

## 2024-09-14 RX ADMIN — CEFEPIME 2000 MG: 2 INJECTION, POWDER, FOR SOLUTION INTRAVENOUS at 05:14

## 2024-09-14 RX ADMIN — ENOXAPARIN SODIUM 40 MG: 40 INJECTION SUBCUTANEOUS at 09:55

## 2024-09-14 RX ADMIN — GABAPENTIN 300 MG: 300 CAPSULE ORAL at 09:55

## 2024-09-14 NOTE — ASSESSMENT & PLAN NOTE
Patient with PMH of urinary incontinence, phimosis and urethral stricture status post arthroplasty with urethral dilatation and internal meatomy 9/6/2024 at [facility] presents to ED with groin pain, burning, fevers  Patient has a history of latex allergy, presented to [facility] ED on 9/11/2024 with penile pain and swelling. ED discussed with [facility] urology, he went back to urology the same day.  Catheter was exchanged and he was given ciprofloxacin for 7 days  He presented to the hospital due to continued pain, burning and fevers  Met septic criteria on admission  CT abdomen pelvis with contrast showed no acute findings, urinary bladder is empty with indwelling Rosado catheter  Follow-up urine and blood cultures  Patient started on cefepime, continue for now  Urology consulted.  No plan to remove catheter.  They are recommending to continue outpatient follow-up for removal which is scheduled at [facility] 9/25    Patient left AGAINST MEDICAL ADVICE.  Attempted to discuss the risks of him leaving which include worsening infection, sepsis and even death.  He excepted the risk and signed the form.  Will send with oral ciprofloxacin 500 mg twice daily for additional 6 days to complete a 7-day course of antibiotics while his indwelling catheter is in place.  Discussed with him red flag symptoms that he should return to the ED which include but are not limited to fevers, worsening abdominal pain, inability to tolerate oral antibiotic, nausea, vomiting, difficulty with urinary drainage, blood in the urine.

## 2024-09-14 NOTE — PLAN OF CARE
Problem: PAIN - ADULT  Goal: Verbalizes/displays adequate comfort level or baseline comfort level  Description: Interventions:  - Encourage patient to monitor pain and request assistance  - Assess pain using appropriate pain scale  - Administer analgesics based on type and severity of pain and evaluate response  - Implement non-pharmacological measures as appropriate and evaluate response  - Consider cultural and social influences on pain and pain management  - Notify physician/advanced practitioner if interventions unsuccessful or patient reports new pain  Outcome: Progressing     Problem: INFECTION - ADULT  Goal: Absence or prevention of progression during hospitalization  Description: INTERVENTIONS:  - Assess and monitor for signs and symptoms of infection  - Monitor lab/diagnostic results  - Monitor all insertion sites, i.e. indwelling lines, tubes, and drains  - Monitor endotracheal if appropriate and nasal secretions for changes in amount and color  - [place] appropriate cooling/warming therapies per order  - Administer medications as ordered  - Instruct and encourage patient and family to use good hand hygiene technique  - Identify and instruct in appropriate isolation precautions for identified infection/condition  Outcome: Progressing  Goal: Absence of fever/infection during neutropenic period  Description: INTERVENTIONS:  - Monitor WBC    Outcome: Progressing     Problem: DISCHARGE PLANNING  Goal: Discharge to home or other facility with appropriate resources  Description: INTERVENTIONS:  - Identify barriers to discharge w/patient and caregiver  - Arrange for needed discharge resources and transportation as appropriate  - Identify discharge learning needs (meds, wound care, etc.)  - Arrange for interpretive services to assist at discharge as needed  - Refer to Case Management Department for coordinating discharge planning if the patient needs post-hospital services based on physician/advanced  practitioner order or complex needs related to functional status, cognitive ability, or social support system  Outcome: Progressing     Problem: GENITOURINARY - ADULT  Goal: Maintains or returns to baseline urinary function  Description: INTERVENTIONS:  - Assess urinary function  - Encourage oral fluids to ensure adequate hydration if ordered  - Administer IV fluids as ordered to ensure adequate hydration  - Administer ordered medications as needed  - Offer frequent toileting  - Follow urinary retention protocol if ordered  Outcome: Progressing  Goal: Absence of urinary retention  Description: INTERVENTIONS:  - Assess patient's ability to void and empty bladder  - Monitor I/O  - Bladder scan as needed  - Discuss with physician/AP medications to alleviate retention as needed  - Discuss catheterization for long term situations as appropriate  Outcome: Progressing  Goal: Urinary catheter remains patent  Description: INTERVENTIONS:  - Assess patency of urinary catheter  - If patient has a chronic calvillo, consider changing catheter if non-functioning  - Follow guidelines for intermittent irrigation of non-functioning urinary catheter  Outcome: Progressing

## 2024-09-14 NOTE — ASSESSMENT & PLAN NOTE
Met sepsis criteria with fever, leukocytosis suspect secondary to catheter associated UTI noted on admission  Without lactic acidosis, procalcitonin negative  Continue IV cefepime  Follow-up urine and blood cultures

## 2024-09-14 NOTE — ASSESSMENT & PLAN NOTE
Patient with PMH of urinary incontinence, phimosis and urethral stricture status post arthroplasty with urethral dilatation and internal meatomy 9/6/2024 at [facility] presents to ED with groin pain, burning, fevers  Patient has a history of latex allergy, presented to [facility] ED on 9/11/2024 with penile pain and swelling. ED discussed with [facility] urology, he went back to urology the same day.  Catheter was exchanged and he was given ciprofloxacin for 7 days  He presented to the hospital due to continued pain, burning and fevers  Met septic criteria on admission  CT abdomen pelvis with contrast showed no acute findings, urinary bladder is empty with indwelling Rosado catheter  Follow-up urine and blood cultures  Patient started on cefepime, continue for now  Urology consulted.  No plan to remove catheter.  They are recommending to continue outpatient follow-up for removal which is scheduled at [facility] 9/25

## 2024-09-14 NOTE — ASSESSMENT & PLAN NOTE
[facility]. Continue gabapentin

## 2024-09-14 NOTE — ASSESSMENT & PLAN NOTE
Met sepsis criteria with fever, leukocytosis suspect secondary to catheter associated UTI noted on admission  Without lactic acidosis, procalcitonin negative  Left AGAINST MEDICAL ADVICE without cultures being back.  He is aware the risk of death and worsening sepsis with this.

## 2024-09-14 NOTE — PLAN OF CARE
Problem: PAIN - ADULT  Goal: Verbalizes/displays adequate comfort level or baseline comfort level  Description: Interventions:  - Encourage patient to monitor pain and request assistance  - Assess pain using appropriate pain scale  - Administer analgesics based on type and severity of pain and evaluate response  - Implement non-pharmacological measures as appropriate and evaluate response  - Consider cultural and social influences on pain and pain management  - Notify physician/advanced practitioner if interventions unsuccessful or patient reports new pain  Outcome: Progressing     Problem: INFECTION - ADULT  Goal: Absence or prevention of progression during hospitalization  Description: INTERVENTIONS:  - Assess and monitor for signs and symptoms of infection  - Monitor lab/diagnostic results  - Monitor all insertion sites, i.e. indwelling lines, tubes, and drains  - Monitor endotracheal if appropriate and nasal secretions for changes in amount and color  - [place] appropriate cooling/warming therapies per order  - Administer medications as ordered  - Instruct and encourage patient and family to use good hand hygiene technique  - Identify and instruct in appropriate isolation precautions for identified infection/condition  Outcome: Progressing  Goal: Absence of fever/infection during neutropenic period  Description: INTERVENTIONS:  - Monitor WBC    Outcome: Progressing     Problem: DISCHARGE PLANNING  Goal: Discharge to home or other facility with appropriate resources  Description: INTERVENTIONS:  - Identify barriers to discharge w/patient and caregiver  - Arrange for needed discharge resources and transportation as appropriate  - Identify discharge learning needs (meds, wound care, etc.)  - Arrange for interpretive services to assist at discharge as needed  - Refer to Case Management Department for coordinating discharge planning if the patient needs post-hospital services based on physician/advanced  practitioner order or complex needs related to functional status, cognitive ability, or social support system  Outcome: Progressing     Problem: GENITOURINARY - ADULT  Goal: Maintains or returns to baseline urinary function  Description: INTERVENTIONS:  - Assess urinary function  - Encourage oral fluids to ensure adequate hydration if ordered  - Administer IV fluids as ordered to ensure adequate hydration  - Administer ordered medications as needed  - Offer frequent toileting  - Follow urinary retention protocol if ordered  Outcome: Progressing  Goal: Absence of urinary retention  Description: INTERVENTIONS:  - Assess patient's ability to void and empty bladder  - Monitor I/O  - Bladder scan as needed  - Discuss with physician/AP medications to alleviate retention as needed  - Discuss catheterization for long term situations as appropriate  Outcome: Progressing  Goal: Urinary catheter remains patent  Description: INTERVENTIONS:  - Assess patency of urinary catheter  - If patient has a chronic calvillo, consider changing catheter if non-functioning  - Follow guidelines for intermittent irrigation of non-functioning urinary catheter  Outcome: Progressing

## 2024-09-14 NOTE — PROGRESS NOTES
RN found patient to be yelling and cursing in the room by himself on the phone. Once patient hung up the phone he explained to RN he was frustrated about his mother and that he wanted to leave against medical advice.  Vincent Turner PA-C notified, aware and arrived at bedside.

## 2024-09-14 NOTE — DISCHARGE SUMMARY
Discharge Summary - Hospitalist   Name: Chaitanya Alexander 48 y.o. male I MRN: [id number]  Unit/Bed#: E5 -01 I Date of Admission: 9/13/2024   Date of Service: 9/14/2024 I Hospital Day: 1     Assessment & Plan  UTI (urinary tract infection)  Patient with PMH of urinary incontinence, phimosis and urethral stricture status post arthroplasty with urethral dilatation and internal meatomy 9/6/2024 at [facility] presents to ED with groin pain, burning, fevers  Patient has a history of latex allergy, presented to [facility] ED on 9/11/2024 with penile pain and swelling. ED discussed with [facility] urology, he went back to urology the same day.  Catheter was exchanged and he was given ciprofloxacin for 7 days  He presented to the hospital due to continued pain, burning and fevers  Met septic criteria on admission  CT abdomen pelvis with contrast showed no acute findings, urinary bladder is empty with indwelling Rosado catheter  Follow-up urine and blood cultures  Patient started on cefepime, continue for now  Urology consulted.  No plan to remove catheter.  They are recommending to continue outpatient follow-up for removal which is scheduled at [facility] 9/25    Patient left AGAINST MEDICAL ADVICE.  Attempted to discuss the risks of him leaving which include worsening infection, sepsis and even death.  He excepted the risk and signed the form.  Will send with oral ciprofloxacin 500 mg twice daily for additional 6 days to complete a 7-day course of antibiotics while his indwelling catheter is in place.  Discussed with him red flag symptoms that he should return to the ED which include but are not limited to fevers, worsening abdominal pain, inability to tolerate oral antibiotic, nausea, vomiting, difficulty with urinary drainage, blood in the urine.   Sepsis (HCC)  Met sepsis criteria with fever, leukocytosis suspect secondary to catheter associated UTI noted on admission  Without lactic acidosis,  procalcitonin negative  Left AGAINST MEDICAL ADVICE without cultures being back.  He is aware the risk of death and worsening sepsis with this.     Medical Problems      Discharging Physician / Practitioner: Vincent Turner PA-C  PCP: No primary care provider on file.  Admission Date:   Admission Orders (From admission, onward)       Ordered        09/13/24 1817  INPATIENT ADMISSION  Once                          Discharge Date: 09/14/24    Consultations During Hospital Stay:  Urology    Procedures Performed:   None    Significant Findings / Test Results:   CT abdomen pelvis with contrast  Result Date: 9/13/2024  Impression: No acute findings in the abdomen or pelvis. The urinary bladder is empty with an indwelling Rosado catheter.     Test Results Pending at Discharge (will require follow up):   Blood cultures  Urine cultures     Outpatient Tests Requested:  PCP follow-up 1 week  Urology follow-up at [facility] 9/25/2024    Reason for Admission: Catheter associated urinary tract infection    Hospital Course:   Chaitanya Alexander is a 48 y.o. male patient who originally presented to the hospital on 9/13/2024 due to   Fevers, chills, pain with the catheter and urinary burning.  Patient was found septic criteria and started on IV antibiotics and fluids.  CT scan of abdomen pelvis displayed no acute findings with urinary bladder being emptied by indwelling Rosado catheter.  Patient was seen in consultation by urology who recommended maintaining Rosado catheter and following up outpatient with already scheduled urology appointment for Rosado catheter removal on 9/25/2024.  Patient was assessed and later in the afternoon requested to leave AGAINST MEDICAL ADVICE.  It was discussed in depth that patient's urine and blood cultures are not back and with early discharge this can result in worsening sepsis, infection and potentially death.  Patient accepted the risks and signed the form.  He will be sent to the pharmacy with empiric oral  ciprofloxacin to complete a 7-day course due to ongoing indwelling Rosado catheter, infection, and unknown urinary organisms.    Please see above list of diagnoses and related plan for additional information.     Condition at Discharge: guarded    Discharge Day Visit / Exam:   * Please refer to separate progress note for these details *    Discussion with Family: Patient declined call to .     Discharge instructions/Information to patient and family:   See after visit summary for information provided to patient and family.      Provisions for Follow-Up Care:  See after visit summary for information related to follow-up care and any pertinent home health orders.      Mobility at time of Discharge:   Basic Mobility Inpatient Raw Score: 24  JH-HLM Goal: 8: Walk 250 feet or more  JH-HLM Achieved: 7: Walk 25 feet or more  HLM Goal achieved. Continue to encourage appropriate mobility.     Disposition:   Home    Planned Readmission: No    Discharge Medications:  See after visit summary for reconciled discharge medications provided to patient and/or family.      **Please Note: This note may have been constructed using a voice recognition system**

## 2024-09-14 NOTE — PROGRESS NOTES
Progress Note - Hospitalist   Name: Chaitanya Alexander 48 y.o. male I MRN: [id number]  Unit/Bed#: E5 -01 I Date of Admission: 9/13/2024   Date of Service: 9/14/2024 I Hospital Day: 1    Assessment & Plan  UTI (urinary tract infection)  Patient with PMH of urinary incontinence, phimosis and urethral stricture status post arthroplasty with urethral dilatation and internal meatomy 9/6/2024 at [facility] presents to ED with groin pain, burning, fevers  Patient has a history of latex allergy, presented to [facility] ED on 9/11/2024 with penile pain and swelling. ED discussed with [facility] urology, he went back to urology the same day.  Catheter was exchanged and he was given ciprofloxacin for 7 days  He presented to the hospital due to continued pain, burning and fevers  Met septic criteria on admission  CT abdomen pelvis with contrast showed no acute findings, urinary bladder is empty with indwelling Rosado catheter  Follow-up urine and blood cultures  Patient started on cefepime, continue for now  Urology consulted.  No plan to remove catheter.  They are recommending to continue outpatient follow-up for removal which is scheduled at [facility] 9/25  Sepsis (HCC)  Met sepsis criteria with fever, leukocytosis suspect secondary to catheter associated UTI noted on admission  Without lactic acidosis, procalcitonin negative  Continue IV cefepime  Follow-up urine and blood cultures  Radiculopathy, lumbar region  [facility]. Continue gabapentin    VTE Pharmacologic Prophylaxis: VTE Score: 3 Moderate Risk (Score 3-4) - Pharmacological DVT Prophylaxis Ordered: enoxaparin (Lovenox).    Mobility:   Basic Mobility Inpatient Raw Score: 24  JH-HLM Goal: 8: Walk 250 feet or more  JH-HLM Achieved: 7: Walk 25 feet or more  JH-HLM Goal achieved. Continue to encourage appropriate mobility.    Patient Centered Rounds: I performed bedside rounds with nursing staff today.      Current Length of Stay: 1 day(s)  Current Patient Status: Inpatient  Certification Statement: The patient will continue to require additional inpatient hospital stay due to sepsis, urine and blood cultures  Discharge Plan: Anticipate discharge in 48 hrs to home.    Code Status: Level 1 - Full Code    Subjective   Patient seen and examined.  He reports he feels fine but is worried about his sepsis status.  He denies any further fevers here overnight, chills, chest pain, shortness of breath or abdominal pain.  He is feeling better since starting the antibiotics.  His urinary catheter is draining well.  He denies any significant penile pain or swelling.  We discussed keeping the catheter in as well as continuing antibiotics and following up urine and blood cultures.  He expressed understanding    Objective     Vitals:   Temp (24hrs), Av °F (37.2 °C), Min:98.1 °F (36.7 °C), Max:100.9 °F (38.3 °C)    Temp:  [98.1 °F (36.7 °C)-100.9 °F (38.3 °C)] 98.1 °F (36.7 °C)  HR:  [] 78  Resp:  [14-19] 19  BP: (106-146)/(58-87) 116/66  SpO2:  [95 %-98 %] 96 %  Body mass index is 25.66 kg/m².     Input and Output Summary (last 24 hours):     Intake/Output Summary (Last 24 hours) at 2024 1441  Last data filed at 2024 0535  Gross per 24 hour   Intake 1000 ml   Output 1600 ml   Net -600 ml       Physical Exam  Constitutional:       General: He is not in acute distress.     Appearance: Normal appearance. He is normal weight. He is not toxic-appearing.   Cardiovascular:      Rate and Rhythm: Normal rate and regular rhythm.      Heart sounds: Normal heart sounds.   Pulmonary:      Effort: Pulmonary effort is normal. No respiratory distress.      Breath sounds: Normal breath sounds.   Abdominal:      General: Bowel sounds are normal. There is no distension.      Palpations: Abdomen is soft.      Tenderness: There is no abdominal tenderness. There is no guarding or rebound.   Genitourinary:     Comments: Indwelling  Rosado catheter draining clear yellow urine, no hematuria or sediment  Musculoskeletal:      Right lower leg: No edema.      Left lower leg: No edema.   Skin:     General: Skin is warm and dry.   Neurological:      Mental Status: He is oriented to person, place, and time. Mental status is at baseline.        Lines/Drains:  Lines/Drains/Airways       Active Status       Name Placement date Placement time Site Days    Urethral Catheter Non-latex 09/13/24  2200  Non-latex  less than 1                  Urinary Catheter:  Goal for removal: Outpatient removal.      Lab Results: I have reviewed the following results:   Results from last 7 days   Lab Units 09/14/24  0620 09/13/24  1619 09/11/24  1219   WBC Thousand/uL 13.72* 19.23* 12.64*   HEMOGLOBIN g/dL 13.7 14.9 14.7   HEMATOCRIT % 40.3 43.1 43.5   PLATELETS Thousands/uL 205 259 259   SEGS PCT %  --   --  81*   LYMPHO PCT %  --  2* 11*   MONO PCT %  --  5 5   EOS PCT %  --  0 3     Results from last 7 days   Lab Units 09/14/24  0620 09/13/24  1619   SODIUM mmol/L 138 138   POTASSIUM mmol/L 4.1 3.7   CHLORIDE mmol/L 111* 106   CO2 mmol/L 25 24   BUN mg/dL 16 18   CREATININE mg/dL 1.11 1.20   ANION GAP mmol/L 2* 8   CALCIUM mg/dL 8.5 9.0   ALBUMIN g/dL  --  4.2   TOTAL BILIRUBIN mg/dL  --  0.55   ALK PHOS U/L  --  70   ALT U/L  --  19   AST U/L  --  14   GLUCOSE RANDOM mg/dL 93 116     Results from last 7 days   Lab Units 09/13/24  1619   LACTIC ACID mmol/L 0.6   PROCALCITONIN ng/ml 0.09     Recent Cultures (last 7 days):   Results from last 7 days   Lab Units 09/13/24  1846 09/13/24  1619   BLOOD CULTURE  Received in Microbiology Lab. Culture in Progress. Received in Microbiology Lab. Culture in Progress.     Last 24 Hours Medication List:     Current Facility-Administered Medications:     acetaminophen (TYLENOL) tablet 650 mg, Q8H PRN    cefepime (MAXIPIME) 2 g/50 mL dextrose IVPB, Q12H, Last Rate: 2,000 mg (09/14/24 0514)    enoxaparin (LOVENOX) subcutaneous injection  40 mg, Daily    gabapentin (NEURONTIN) capsule 300 mg, TID    oxybutynin (DITROPAN) tablet 5 mg, TID    oxyCODONE (ROXICODONE) IR tablet 5 mg, Q6H PRN      **Please Note: This note may have been constructed using a voice recognition system.**

## 2024-09-14 NOTE — DISCHARGE INSTR - AVS FIRST PAGE
You were sent on ciprofloxacin which is an antibiotic.  Take this 2 times daily 12 hours apart with food. Please finish the course.     At any point if you have fevers, worsening pain, unable to tolerate the oral antibiotic seek care in the ED    Follow up with your family doctor as soon as possible    Keep the catheter in, you need it removed 9/25

## 2024-09-15 NOTE — UTILIZATION REVIEW
Initial Clinical Review    Admission: Date/Time/Statement:   Admission Orders (From admission, onward)       Ordered        09/13/24 1817  INPATIENT ADMISSION  Once                          Orders Placed This Encounter   Procedures    INPATIENT ADMISSION     Standing Status:   Standing     Number of Occurrences:   1     Order Specific Question:   Level of Care     Answer:   Med Surg [16]     Order Specific Question:   Estimated length of stay     Answer:   More than 2 Midnights     Order Specific Question:   Certification     Answer:   I certify that inpatient services are medically necessary for this patient for a duration of greater than two midnights. See H&P and MD Progress Notes for additional information about the patient's course of treatment.     ED Arrival Information       Expected   -    Arrival   9/13/2024 15:39    Acuity   Urgent              Means of arrival   Walk-In    Escorted by   Self    Service   Hospitalist    Admission type   Emergency              Arrival complaint   catherer problem             Chief Complaint   Patient presents with    Possible UTI     Patient with urinary catheter. States draining appropriately but feels burning and has been having chills and lower abdominal pain.       Initial Presentation: 48 y.o. male to ED from home admitted Inpatient d/t UTI.Met sepsis criteria.  PMH of back pain, using medical marijuana. 1 week ago he had as surgery at [facility] he has a history of urinary incontinence, phimosis and urethral stricture, s/p urethroplasty urethral dilatation and internal meatomy.  history of latex allergy, Rosado catheter was placed per [facility] urology note none silicone catheter was placed intraoperatively. came back to the ED 9/11 for burning and pain with Rosado.  ED discussed with [facility] urology, he went back to urology the same day and the catheter was exchanged he was given antibiotics. presented to the hospital due to continued pain and  burning.fever, leukocytosis suspect secondary to UTI. WBC 19.T 100.9. . CT abdomen pelvis with contrast showed no acute findings, urinary bladder is empty with indwelling Rosado catheter.Urine culture is pending.Blood cultures are pending. IVF. IV antibiotic.IV analgesic.Urology saw the patient, no plan to remove the catheter, he will follow-up with his primary urologist.    Anticipated Length of Stay/Certification Statement: Patient will be admitted on an inpatient basis with an anticipated length of stay of greater than 2 midnights secondary to UTI.     Date: 9/14   Day 2:feels fine but is worried about his sepsis status. He denies any further fevers here overnight, chills, chest pain, shortness of breath or abdominal pain. He is feeling better since starting the antibiotics. His urinary catheter is draining well. He denies any significant penile pain or swelling. We discussed keeping the catheter in as well as continuing antibiotics and following up urine and blood cultures.  WBC 13.IVF. IV antibiotic.IV analgesic.Discharged.Urology consulted.  No plan to remove catheter.  They are recommending to continue outpatient follow-up for removal which is scheduled at [facility] 9/25.    ED Triage Vitals   Temperature Pulse Respirations Blood Pressure SpO2 Pain Score   09/13/24 1549 09/13/24 1548 09/13/24 1548 09/13/24 1548 09/13/24 1548 09/13/24 1643   (!) 100.9 °F (38.3 °C) (!) 108 18 125/73 98 % 10 - Worst Possible Pain     Weight (last 2 days) before discharge       Date/Time Weight    09/13/24 1901 72.1 (159)    09/13/24 1548 72.5 (159.83)            Vital Signs (last 3 days) before discharge       Date/Time Temp Pulse Resp BP MAP (mmHg) SpO2 O2 Device Patient Position - Orthostatic VS Pain    09/14/24 0950 -- -- -- -- -- -- -- -- No Pain    09/14/24 08:13:03 98.1 °F (36.7 °C) 78 19 116/66 83 96 % -- Lying --    09/14/24 00:36:55 98.3 °F (36.8 °C) -- 17 146/87 107 -- None (Room air) Lying --    09/13/24  20:16:12 98.8 °F (37.1 °C) -- 15 118/71 87 -- None (Room air) Lying --    09/13/24 1959 -- -- -- -- -- -- -- -- 10 - Worst Possible Pain    09/13/24 1901 -- -- -- -- -- -- None (Room air) -- --    09/13/24 1830 98.9 °F (37.2 °C) 78 16 106/58 77 95 % None (Room air) Lying 9    09/13/24 1730 -- 83 14 111/59 81 97 % None (Room air) Sitting --    09/13/24 1643 -- -- -- -- -- -- -- -- 10 - Worst Possible Pain    09/13/24 1549 100.9 °F (38.3 °C) -- -- -- -- -- -- -- --    09/13/24 1548 -- 108 18 125/73 -- 98 % None (Room air) -- --              Pertinent Labs/Diagnostic Test Results:   Radiology:  CT abdomen pelvis with contrast   Final Interpretation by Darin Roblero MD (09/13 1649)      No acute findings in the abdomen or pelvis.   The urinary bladder is empty with an indwelling Rosado catheter.         Workstation performed: [id number]           Cardiology:  ECG 12 lead   Final Result by Laya Amado MD (09/14 2334)   Normal sinus rhythm   Normal ECG   When compared with ECG of 13-SEP-2024 16:46, (unconfirmed)   No significant change was found   Confirmed by Laya Amado ([id number]) on 9/14/2024 11:34:20 PM      ECG 12 lead   Final Result by Laya Amado MD (09/14 2334)   Normal sinus rhythm   Normal ECG   When compared with ECG of 25-DEC-2023 19:25,   No significant change was found   Confirmed by Laya Amado ([id number]) on 9/14/2024 11:34:50 PM                  Results from last 7 days   Lab Units 09/14/24  0620 09/13/24  1619 09/11/24  1219   WBC Thousand/uL 13.72* 19.23* 12.64*   HEMOGLOBIN g/dL 13.7 14.9 14.7   HEMATOCRIT % 40.3 43.1 43.5   PLATELETS Thousands/uL 205 259 259   TOTAL NEUT ABS Thousands/µL  --   --  10.23*         Results from last 7 days   Lab Units 09/14/24  0620 09/13/24  1619 09/11/24  1219   SODIUM mmol/L 138 138 139   POTASSIUM mmol/L 4.1 3.7 4.4   CHLORIDE mmol/L 111* 106 107   CO2 mmol/L 25 24 28   ANION GAP mmol/L 2* 8 4   BUN mg/dL 16 18 18   CREATININE mg/dL 1.11 1.20 1.29   EGFR  ml/min/1.73sq m 77 70 64   CALCIUM mg/dL 8.5 9.0 8.7     Results from last 7 days   Lab Units 09/13/24  1619   AST U/L 14   ALT U/L 19   ALK PHOS U/L 70   TOTAL PROTEIN g/dL 7.1   ALBUMIN g/dL 4.2   TOTAL BILIRUBIN mg/dL 0.55   BILIRUBIN DIRECT mg/dL 0.10         Results from last 7 days   Lab Units 09/14/24  0620 09/13/24  1619 09/11/24  1219   GLUCOSE RANDOM mg/dL 93 116 105       Results from last 7 days   Lab Units 09/13/24  2031 09/13/24  1846 09/13/24  1619   HS TNI 0HR ng/L  --   --  <2   HS TNI 2HR ng/L  --  <2  --    HS TNI 4HR ng/L <2  --   --                  Results from last 7 days   Lab Units 09/13/24  1619   PROCALCITONIN ng/ml 0.09     Results from last 7 days   Lab Units 09/13/24  1619   LACTIC ACID mmol/L 0.6       Results from last 7 days   Lab Units 09/13/24  1846 09/13/24  1619   BLOOD CULTURE  No Growth at 24 hrs. No Growth at 24 hrs.   URINE CULTURE  No Growth <1000 cfu/mL  --        ED Treatment-Medication Administration from 09/13/2024 1539 to 09/13/2024 1952         Date/Time Order Dose Route Action     09/13/2024 1645 sodium chloride 0.9 % bolus 1,000 mL 1,000 mL Intravenous New Bag     09/13/2024 1642 ondansetron (ZOFRAN) injection 4 mg 4 mg Intravenous Given     09/13/2024 1643 ketorolac (TORADOL) injection 15 mg 15 mg Intravenous Given              09/13/2024 1846 cefepime (MAXIPIME) 2 g/50 mL dextrose IVPB 2,000 mg Intravenous New Bag            Past Medical History:   Diagnosis Date    Anxiety     Depression      Present on Admission:  **None**      Admitting Diagnosis: UTI (urinary tract infection) [N39.0]  Sepsis (HCC) [A41.9]  Age/Sex: 48 y.o. male  Admission Orders:  Scheduled Medications:  cefepime (MAXIPIME) 2 g/50 mL dextrose IVPB  Dose: 2,000 mg  Freq: Every 12 hours Route: IV  Last Dose: 2,000 mg (09/14/24 0514)  Start: 09/14/24 0700 End: 09/14/24 1840   Admin Instructions:      Order specific questions:                0514     1840-D/C'd      cefepime (MAXIPIME) 2 g/50 mL  dextrose IVPB  Dose: 2,000 mg  Freq: Once Route: IV  Last Dose: 2,000 mg (09/13/24 1846)  Start: 09/13/24 1715 End: 09/13/24 1916   Admin Instructions:      Order specific questions:               1846         enoxaparin (LOVENOX) subcutaneous injection 40 mg  Dose: 40 mg  Freq: Daily Route: SC  Start: 09/14/24 0900 End: 09/14/24 1840   Admin Instructions:                0955     1840-D/C'd      gabapentin (NEURONTIN) capsule 300 mg  Dose: 300 mg  Freq: 3 times daily Route: PO  Start: 09/13/24 2100 End: 09/14/24 1840   Admin Instructions:               2022 0955     1840-D/C'd      HYDROmorphone (DILAUDID) injection 0.5 mg  Dose: 0.5 mg  Freq: Once Route: IV  Start: 09/13/24 1915 End: 09/13/24 2022   Admin Instructions:               2022         HYDROmorphone (DILAUDID) injection 1 mg  Dose: 1 mg  Freq: Once Route: IV  Start: 09/11/24 1345 End: 09/11/24 1353   Admin Instructions:             1353           HYDROmorphone (DILAUDID) injection 1 mg  Dose: 1 mg  Freq: Once Route: IV  Start: 09/11/24 1215 End: 09/11/24 1217   Admin Instructions:             1217           ketorolac (TORADOL) injection 15 mg  Dose: 15 mg  Freq: Once Route: IV  Start: 09/13/24 1615 End: 09/13/24 1643   Admin Instructions:               1643         ondansetron (ZOFRAN) injection 4 mg  Dose: 4 mg  Freq: Once Route: IV  Start: 09/13/24 1615 End: 09/13/24 1642   Admin Instructions:               1642         ondansetron (ZOFRAN) injection 4 mg  Dose: 4 mg  Freq: Once Route: IV  Start: 09/11/24 1215 End: 09/11/24 1217   Admin Instructions:             1217           oxybutynin (DITROPAN) tablet 5 mg  Dose: 5 mg  Freq: 3 times daily Route: PO  Start: 09/13/24 2100 End: 09/14/24 1840   Admin Instructions:               2022 0955     1840-D/C'd      sodium chloride 0.9 % bolus 1,000 mL  Dose: 1,000 mL  Freq: Once Route: IV  Last Dose: Stopped (09/13/24 1840)  Start: 09/13/24 1615 End: 09/13/24 1840 1645 1840                      Continuous Meds Sorted by Name  for Chaitanya Alexander as of 09/05/24 through 9/14/24  Legend:       Medications 09/05 09/06 09/07 09/08 09/09 09/10 09/11 09/12 09/13 09/14   sodium chloride 0.9 % infusion  Rate: 75 mL/hr Dose: 75 mL/hr  Freq: Continuous Route: IV  Last Dose: Stopped (09/14/24 1500)  Start: 09/13/24 1930 End: 09/14/24 1436 2025      1436-D/C'd  1500        Scd  I&O  Oob  Reg diet        Network Utilization Review Department  ATTENTION: Please call with any questions or concerns to [phone] and carefully listen to the prompts so that you are directed to the right person. All voicemails are confidential.   For Discharge needs, contact Care Management DC Support Team at [phone] opt. [phone]  Send all requests for admission clinical reviews, approved or denied determinations and any other requests to dedicated fax number below belonging to the [place] where the patient is receiving treatment. List of dedicated fax numbers for the Facilities:  FACILITY NAME UR FAX NUMBER   ADMISSION DENIALS (Administrative/Medical Necessity) [phone]   DISCHARGE SUPPORT TEAM (NETWORK) [phone]   PARENT CHILD HEALTH (Maternity/NICU/Pediatrics) [phone]   [facility] [phone]   [facility] [phone]   [facility] [phone]   [facility] [phone]   [facility] [phone]   [facility] [phone]   [facility] [phone]   [facility] [phone]   [facility] [phone]   Count includes the [facility] [phone]   [facility] [phone]   [facility] [phone]

## 2024-09-15 NOTE — ED PROVIDER NOTES
1. UTI (urinary tract infection)    2. Sepsis (HCC)      ED Disposition       ED Disposition   Admit    Condition   Stable    Date/Time   Fri Sep 13, 2024  5:29 PM    Comment   Case was discussed with DEMIAN and the patient's admission status was agreed to be Admission Status: inpatient status to the service of   .               Assessment & Plan       Medical Decision Making  48-year-old male with recent urological procedure for urethral stricture, has indwelling Rosado here for dysuria, lower abdominal pain, and fever.  He met sepsis criteria on presentation with fever, tachycardia, and elevated WBC.  Given his description of symptoms as well as his recent history, most concerning for urinary source.  Other laboratory studies including lactic acid and procalcitonin, renal function were reassuring.  CT imaging did not identify any abscess or other inflammatory process in the abdomen and pelvis.  Was given IV antibiotics and IV fluids, discussed with urology on-call, admitted to internal medicine for further eval and treatment.    Amount and/or Complexity of Data Reviewed  Labs: ordered. Decision-making details documented in ED Course.  Radiology: ordered. Decision-making details documented in ED Course.  ECG/medicine tests: ordered and independent interpretation performed. Decision-making details documented in ED Course.     Details: Normal sinus rhythm at rate of 92, normal axis, normal intervals, no significant ST elevations or depressions to suggest cardiac ischemia.  Normal EKG.    Risk  Prescription drug management.  Decision regarding hospitalization.                       Medications   sodium chloride 0.9 % bolus 1,000 mL (0 mL Intravenous Stopped 9/13/24 1840)   ondansetron (ZOFRAN) injection 4 mg (4 mg Intravenous Given 9/13/24 1642)   ketorolac (TORADOL) injection 15 mg (15 mg Intravenous Given 9/13/24 1643)   iohexol (OMNIPAQUE) 350 MG/ML injection (SINGLE-DOSE) 100 mL (100 mL Intravenous Given 9/13/24  "1635)   cefepime (MAXIPIME) 2 g/50 mL dextrose IVPB (2,000 mg Intravenous New Bag 9/13/24 1846)   HYDROmorphone (DILAUDID) injection 0.5 mg (has no administration in time range)       History of Present Illness       Chaitanya is a 48-year-old male here for evaluation of dysuria and fever.  He has a history of a ureteral meatal stricture and had stricture dilatation and urethroplasty performed by [facility] urology on 9/6/2024.  He was discharged with a Rosado catheter.  He returned to our emergency department on 9/11/2024 due to urethral discomfort.  He apparently has an allergy to latex and had been discharged with a latex catheter.  He was evaluated in the ED and started on oral antibiotics for possible UTI.  He had his Rosado catheter exchanged by [facility] urology later that day.  Returns to the ED today reporting continued pain and sensation of \"burning\" in his ureter with even minimal movement.  He has also started to develop fever and generalized bodyaches.  He is nauseated but has not had vomiting.  He reports lower abdominal pain with radiation towards his low back.              Review of Systems   Constitutional:  Positive for fatigue and fever. Negative for chills.   HENT:  Negative for ear pain and sore throat.    Eyes:  Negative for pain and visual disturbance.   Respiratory:  Negative for cough and shortness of breath.    Cardiovascular:  Negative for chest pain and palpitations.   Gastrointestinal:  Positive for abdominal pain and nausea. Negative for vomiting.   Genitourinary:  Positive for dysuria and urgency. Negative for frequency and hematuria.   Musculoskeletal:  Positive for myalgias. Negative for arthralgias and back pain.   Skin:  Negative for color change and rash.   Neurological:  Negative for seizures and syncope.   All other systems reviewed and are negative.          Objective     ED Triage Vitals   Temperature Pulse Blood Pressure Respirations SpO2 Patient Position - Orthostatic VS   09/13/24 1549 " 09/13/24 1548 09/13/24 1548 09/13/24 1548 09/13/24 1548 09/13/24 1730   (!) 100.9 °F (38.3 °C) (!) 108 125/73 18 98 % Sitting      Temp Source Heart Rate Source BP Location FiO2 (%) Pain Score    09/13/24 1549 09/13/24 1730 09/13/24 1730 -- 09/13/24 1643    Oral Monitor Right arm  10 - Worst Possible Pain        Physical Exam  Vitals and nursing note reviewed.   Constitutional:       General: He is not in acute distress.     Appearance: He is well-developed.   HENT:      Head: Normocephalic and atraumatic.   Eyes:      Conjunctiva/sclera: Conjunctivae normal.   Cardiovascular:      Rate and Rhythm: Normal rate and regular rhythm.      Heart sounds: No murmur heard.  Pulmonary:      Effort: Pulmonary effort is normal. No respiratory distress.      Breath sounds: Normal breath sounds.   Abdominal:      Palpations: Abdomen is soft.      Tenderness: There is abdominal tenderness.   Genitourinary:     Comments: Rosado catheter in place draining orange urine  Musculoskeletal:      Cervical back: Neck supple.   Skin:     General: Skin is warm and dry.      Capillary Refill: Capillary refill takes less than 2 seconds.   Neurological:      General: No focal deficit present.      Mental Status: He is alert and oriented to person, place, and time.   Psychiatric:         Mood and Affect: Mood normal.         Labs Reviewed   CBC AND DIFFERENTIAL - Abnormal       Result Value    WBC 19.23 (*)     RBC 4.80      Hemoglobin 14.9      Hematocrit 43.1      MCV 90      MCH 31.0      MCHC 34.6      RDW 12.6      MPV 10.2      Platelets 259      Narrative:     This is an appended report.  These results have been appended to a previously verified report.   BASIC METABOLIC PANEL - Abnormal    Sodium 138      Potassium 4.1      Chloride 111 (*)     CO2 25      ANION GAP 2 (*)     BUN 16      Creatinine 1.11      Glucose 93      Calcium 8.5      eGFR 77      Narrative:     National Kidney Disease Foundation guidelines for Chronic Kidney  Disease (CKD):     Stage 1 with normal or high GFR (GFR > 90 mL/min/1.73 square meters)    Stage 2 Mild CKD (GFR = 60-89 mL/min/1.73 square meters)    Stage 3A Moderate CKD (GFR = 45-59 mL/min/1.73 square meters)    Stage 3B Moderate CKD (GFR = 30-44 mL/min/1.73 square meters)    Stage 4 Severe CKD (GFR = 15-29 mL/min/1.73 square meters)    Stage 5 End Stage CKD (GFR <15 mL/min/1.73 square meters)  Note: GFR calculation is accurate only with a steady state creatinine   CBC AND PLATELET - Abnormal    WBC 13.72 (*)     RBC 4.40      Hemoglobin 13.7      Hematocrit 40.3      MCV 92      MCH 31.1      MCHC 34.0      RDW 12.7      Platelets 205      MPV 9.7     MANUAL DIFFERENTIAL(PHLEBS DO NOT ORDER) - Abnormal    Segmented % 93 (*)     Lymphocytes % 2 (*)     Monocytes % 5      Eosinophils % 0      Basophils % 0      Absolute Neutrophils 17.88 (*)     Absolute Lymphocytes 0.38 (*)     Absolute Monocytes 0.96      Absolute Eosinophils 0.00      Absolute Basophils 0.00      Total Counted        RBC Morphology Normal      Platelet Estimate Adequate     HEPATIC FUNCTION PANEL - Normal    Total Bilirubin 0.55      Bilirubin, Direct 0.10      Alkaline Phosphatase 70      AST 14      ALT 19      Total Protein 7.1      Albumin 4.2     HS TROPONIN I 0HR - Normal    hs TnI 0hr <2     LACTIC ACID, PLASMA (W/REFLEX IF RESULT > 2.0) - Normal    LACTIC ACID 0.6      Narrative:     Result may be elevated if tourniquet was used during collection.   PROCALCITONIN TEST - Normal    Procalcitonin 0.09     URINE CULTURE    Urine Culture No Growth <1000 cfu/mL     BLOOD CULTURE    Blood Culture No Growth at 24 hrs.     BLOOD CULTURE    Blood Culture No Growth at 24 hrs.     BASIC METABOLIC PANEL    Sodium 138      Potassium 3.7      Chloride 106      CO2 24      ANION GAP 8      BUN 18      Creatinine 1.20      Glucose 116      Calcium 9.0      eGFR 70      Narrative:     National Kidney Disease Foundation guidelines for Chronic Kidney  Disease (CKD):     Stage 1 with normal or high GFR (GFR > 90 mL/min/1.73 square meters)    Stage 2 Mild CKD (GFR = 60-89 mL/min/1.73 square meters)    Stage 3A Moderate CKD (GFR = 45-59 mL/min/1.73 square meters)    Stage 3B Moderate CKD (GFR = 30-44 mL/min/1.73 square meters)    Stage 4 Severe CKD (GFR = 15-29 mL/min/1.73 square meters)    Stage 5 End Stage CKD (GFR <15 mL/min/1.73 square meters)  Note: GFR calculation is accurate only with a steady state creatinine   HS TROPONIN I 2HR    hs TnI 2hr <2      Delta 2hr hsTnI       HS TROPONIN I 4HR    hs TnI 4hr <2      Delta 4hr hsTnI         CT abdomen pelvis with contrast   Final Interpretation by Darin Roblero MD (09/13 1649)      No acute findings in the abdomen or pelvis.   The urinary bladder is empty with an indwelling Rosado catheter.         Workstation performed: [id number]             CriticalCare Time    Date/Time: 9/13/2024 4:24 PM    Performed by: Vinay Pang MD  Authorized by: Vinay Pang MD    Critical care provider statement:     Critical care time (minutes):  41    Critical care time was exclusive of:  Separately billable procedures and treating other patients and teaching time    Critical care was necessary to treat or prevent imminent or life-threatening deterioration of the following conditions:  Sepsis    Critical care was time spent personally by me on the following activities:  Blood draw for specimens, obtaining history from patient or surrogate, development of treatment plan with patient or surrogate, discussions with consultants, examination of patient, evaluation of patient's response to treatment, ordering and performing treatments and interventions, ordering and review of laboratory studies, ordering and review of radiographic studies, re-evaluation of patient's condition and review of old charts    I assumed direction of critical care for this patient from another provider in my specialty: no           Vinay Deshpande  MD Bird  09/15/24 0023       Vinay Pang MD  09/15/24 0024

## 2024-09-16 ENCOUNTER — HOSPITAL ENCOUNTER (EMERGENCY)
Facility: HOSPITAL | Age: 49
Discharge: HOME | End: 2024-09-16
Attending: EMERGENCY MEDICINE
Payer: COMMERCIAL

## 2024-09-16 VITALS
DIASTOLIC BLOOD PRESSURE: 72 MMHG | TEMPERATURE: 98.7 F | OXYGEN SATURATION: 99 % | RESPIRATION RATE: 16 BRPM | BODY MASS INDEX: 25.83 KG/M2 | HEART RATE: 69 BPM | SYSTOLIC BLOOD PRESSURE: 137 MMHG | WEIGHT: 160.05 LBS

## 2024-09-16 DIAGNOSIS — Z97.8 FOLEY CATHETER IN PLACE: ICD-10-CM

## 2024-09-16 DIAGNOSIS — R10.2 SUPRAPUBIC PAIN: Primary | ICD-10-CM

## 2024-09-16 DIAGNOSIS — K62.89 RECTAL PAIN: ICD-10-CM

## 2024-09-16 LAB
ALBUMIN SERPL BCG-MCNC: 3.9 G/DL (ref 3.5–5)
ALP SERPL-CCNC: 65 U/L (ref 34–104)
ALT SERPL W P-5'-P-CCNC: 15 U/L (ref 7–52)
ANION GAP SERPL CALCULATED.3IONS-SCNC: 4 MMOL/L (ref 4–13)
APTT PPP: 29 SECONDS (ref 23–34)
AST SERPL W P-5'-P-CCNC: 13 U/L (ref 13–39)
BASOPHILS # BLD AUTO: 0.04 THOUSANDS/ΜL (ref 0–0.1)
BASOPHILS NFR BLD AUTO: 0 % (ref 0–1)
BILIRUB SERPL-MCNC: 0.26 MG/DL (ref 0.2–1)
BUN SERPL-MCNC: 21 MG/DL (ref 5–25)
CALCIUM SERPL-MCNC: 8.5 MG/DL (ref 8.4–10.2)
CHLORIDE SERPL-SCNC: 110 MMOL/L (ref 96–108)
CO2 SERPL-SCNC: 25 MMOL/L (ref 21–32)
CREAT SERPL-MCNC: 1.38 MG/DL (ref 0.6–1.3)
EOSINOPHIL # BLD AUTO: 0.3 THOUSAND/ΜL (ref 0–0.61)
EOSINOPHIL NFR BLD AUTO: 3 % (ref 0–6)
ERYTHROCYTE [DISTWIDTH] IN BLOOD BY AUTOMATED COUNT: 12.6 % (ref 11.6–15.1)
GFR SERPL CREATININE-BSD FRML MDRD: 59 ML/MIN/1.73SQ M
GLUCOSE SERPL-MCNC: 89 MG/DL (ref 65–140)
HCT VFR BLD AUTO: 40.2 % (ref 36.5–49.3)
HGB BLD-MCNC: 13.4 G/DL (ref 12–17)
IMM GRANULOCYTES # BLD AUTO: 0.03 THOUSAND/UL (ref 0–0.2)
IMM GRANULOCYTES NFR BLD AUTO: 0 % (ref 0–2)
INR PPP: 1.03 (ref 0.85–1.19)
LACTATE SERPL-SCNC: 0.6 MMOL/L (ref 0.5–2)
LYMPHOCYTES # BLD AUTO: 1.69 THOUSANDS/ΜL (ref 0.6–4.47)
LYMPHOCYTES NFR BLD AUTO: 18 % (ref 14–44)
MCH RBC QN AUTO: 29.9 PG (ref 26.8–34.3)
MCHC RBC AUTO-ENTMCNC: 33.3 G/DL (ref 31.4–37.4)
MCV RBC AUTO: 90 FL (ref 82–98)
MONOCYTES # BLD AUTO: 0.69 THOUSAND/ΜL (ref 0.17–1.22)
MONOCYTES NFR BLD AUTO: 7 % (ref 4–12)
NEUTROPHILS # BLD AUTO: 6.86 THOUSANDS/ΜL (ref 1.85–7.62)
NEUTS SEG NFR BLD AUTO: 72 % (ref 43–75)
NRBC BLD AUTO-RTO: 0 /100 WBCS
PLATELET # BLD AUTO: 264 THOUSANDS/UL (ref 149–390)
PMV BLD AUTO: 10.1 FL (ref 8.9–12.7)
POTASSIUM SERPL-SCNC: 4.1 MMOL/L (ref 3.5–5.3)
PROCALCITONIN SERPL-MCNC: 0.09 NG/ML
PROT SERPL-MCNC: 6.1 G/DL (ref 6.4–8.4)
PROTHROMBIN TIME: 13.7 SECONDS (ref 12.3–15)
RBC # BLD AUTO: 4.48 MILLION/UL (ref 3.88–5.62)
SODIUM SERPL-SCNC: 139 MMOL/L (ref 135–147)
WBC # BLD AUTO: 9.61 THOUSAND/UL (ref 4.31–10.16)

## 2024-09-16 PROCEDURE — 96375 TX/PRO/DX INJ NEW DRUG ADDON: CPT

## 2024-09-16 PROCEDURE — 85730 THROMBOPLASTIN TIME PARTIAL: CPT

## 2024-09-16 PROCEDURE — 83605 ASSAY OF LACTIC ACID: CPT

## 2024-09-16 PROCEDURE — 96365 THER/PROPH/DIAG IV INF INIT: CPT

## 2024-09-16 PROCEDURE — 80053 COMPREHEN METABOLIC PANEL: CPT

## 2024-09-16 PROCEDURE — 84145 PROCALCITONIN (PCT): CPT

## 2024-09-16 PROCEDURE — 96361 HYDRATE IV INFUSION ADD-ON: CPT

## 2024-09-16 PROCEDURE — 85025 COMPLETE CBC W/AUTO DIFF WBC: CPT

## 2024-09-16 PROCEDURE — 85610 PROTHROMBIN TIME: CPT

## 2024-09-16 PROCEDURE — 99284 EMERGENCY DEPT VISIT MOD MDM: CPT

## 2024-09-16 PROCEDURE — 99283 EMERGENCY DEPT VISIT LOW MDM: CPT

## 2024-09-16 PROCEDURE — 36415 COLL VENOUS BLD VENIPUNCTURE: CPT

## 2024-09-16 RX ORDER — HYDROCORTISONE ACETATE 25 MG/1
25 SUPPOSITORY RECTAL ONCE
Status: DISCONTINUED | OUTPATIENT
Start: 2024-09-16 | End: 2024-09-17 | Stop reason: HOSPADM

## 2024-09-16 RX ORDER — ACETAMINOPHEN 10 MG/ML
1000 INJECTION, SOLUTION INTRAVENOUS ONCE
Status: COMPLETED | OUTPATIENT
Start: 2024-09-16 | End: 2024-09-16

## 2024-09-16 RX ORDER — OXYBUTYNIN CHLORIDE 5 MG/1
5 TABLET, EXTENDED RELEASE ORAL DAILY
Qty: 20 TABLET | Refills: 0 | Status: SHIPPED | OUTPATIENT
Start: 2024-09-16

## 2024-09-16 RX ORDER — OXYBUTYNIN CHLORIDE 5 MG/1
5 TABLET, EXTENDED RELEASE ORAL ONCE
Status: COMPLETED | OUTPATIENT
Start: 2024-09-16 | End: 2024-09-16

## 2024-09-16 RX ORDER — KETOROLAC TROMETHAMINE 30 MG/ML
15 INJECTION, SOLUTION INTRAMUSCULAR; INTRAVENOUS ONCE
Status: COMPLETED | OUTPATIENT
Start: 2024-09-16 | End: 2024-09-16

## 2024-09-16 RX ORDER — OXYCODONE AND ACETAMINOPHEN 5; 325 MG/1; MG/1
1 TABLET ORAL ONCE
Status: COMPLETED | OUTPATIENT
Start: 2024-09-16 | End: 2024-09-16

## 2024-09-16 RX ORDER — OXYBUTYNIN CHLORIDE 5 MG/1
5 TABLET, EXTENDED RELEASE ORAL DAILY
Status: DISCONTINUED | OUTPATIENT
Start: 2024-09-17 | End: 2024-09-16

## 2024-09-16 RX ADMIN — OXYCODONE HYDROCHLORIDE AND ACETAMINOPHEN 1 TABLET: 5; 325 TABLET ORAL at 19:57

## 2024-09-16 RX ADMIN — KETOROLAC TROMETHAMINE 15 MG: 30 INJECTION, SOLUTION INTRAMUSCULAR; INTRAVENOUS at 19:49

## 2024-09-16 RX ADMIN — ACETAMINOPHEN 1000 MG: 10 INJECTION INTRAVENOUS at 21:51

## 2024-09-16 RX ADMIN — OXYBUTYNIN CHLORIDE 5 MG: 5 TABLET, EXTENDED RELEASE ORAL at 19:58

## 2024-09-16 RX ADMIN — SODIUM CHLORIDE 1000 ML: 0.9 INJECTION, SOLUTION INTRAVENOUS at 20:40

## 2024-09-16 NOTE — ED NOTES
New urine collection bag applied to catheter for clean specimen collection     Sheila Lobo RN  09/16/24 7489

## 2024-09-16 NOTE — UTILIZATION REVIEW
NOTIFICATION OF ADMISSION DISCHARGE   This is a Notification of Discharge from [facility]. Please be advised that this patient has been discharge from our facility. Below you will find the admission and discharge date and time including the patient’s disposition.   UTILIZATION REVIEW CONTACT:  Sheila Shepherd  Utilization   Network Utilization Review Department  Phone: [phone] x carefully listen to the prompts. All voicemails are confidential.  Email: [email or web address]     ADMISSION INFORMATION  PRESENTATION DATE: 9/13/2024  3:49 PM  OBERVATION ADMISSION DATE: N/A  INPATIENT ADMISSION DATE: 9/13/24  6:17 PM   DISCHARGE DATE: 9/14/2024  3:45 PM   DISPOSITION:Left against medical advice or discontinued care    Network Utilization Review Department  ATTENTION: Please call with any questions or concerns to [phone] and carefully listen to the prompts so that you are directed to the right person. All voicemails are confidential.   For Discharge needs, contact Care Management DC Support Team at [phone] opt. 2  Send all requests for admission clinical reviews, approved or denied determinations and any other requests to dedicated fax number below belonging to the campus where the patient is receiving treatment. List of dedicated fax numbers for the Facilities:  FACILITY NAME UR FAX NUMBER   ADMISSION DENIALS (Administrative/Medical Necessity) [phone]   DISCHARGE SUPPORT TEAM ([facility]) [phone]   PARENT CHILD HEALTH (Maternity/NICU/Pediatrics) [phone]   [facility] [phone]   [facility] [phone]   [facility] [phone]   [facility] [phone]   Critical access hospital [phone]   [facility] [phone]   [facility] [phone]   [facility]  [facility] [phone]   [facility] [phone]   [facility] [phone]   [facility] [phone]   [facility] [phone]

## 2024-09-16 NOTE — UTILIZATION REVIEW
[facility] ambulatory encounter  FAMILY PRACTICE OFFICE VISIT    CHIEF COMPLAINT:    Chief Complaint   Patient presents with   • MEDICATION ISSUE     refills and urine test also would like a new psychiatrist       SUBJECTIVE:  Alice Gomez is a 27 year old female who presented requesting evaluation for MEDICATION ISSUE (refills and urine test also would like a new psychiatrist)  .  Patient is here for routine Medication Refill evaluation and follow-up.      She states she would like to see a different Psychiatrist-requesting referral.  Medications are working very well-no side effects. She states she is concerned about withdrawal symptoms if stopped abruptly.    Denies SI, Denies HI.     Declines PAP, and vaccines at this time       Review of systems:    All other systems are reviewed and are negative except as documented in the history of present illness.   Constitutional:  Denies fever or chills.   Eyes:  Denies change in visual acuity, diplopia.   Ears, Nose, Throat:  Denies nasal congestion, sore throat.   Respiratory:  Denies cough or shortness of breath.    Cardiovascular:  Denies chest pain, edema, palpitations, orthopnea.      Gastrointestinal:  Denies abdominal pain, nausea, vomiting, bloody stools or  diarrhea.    Genitourinary:  Denies dysuria, frequency, urgency.    Integument:  Denies rash.   Neurologic:  Denies headache, focal weakness or sensory changes.    Endocrine:  Denies polyuria or polydipsia.        OBJECTIVE:  PROBLEM LIST:    Patient Active Problem List   Diagnosis   • Tobacco abuse   • History of cervical fracture   • ADD (attention deficit disorder)   • History of dissection of artery   • Myofascial pain syndrome, cervical   • Marijuana abuse       PAST HISTORIES:  I have reviewed the past medical history, family history, social history, medications and allergies listed in the medical record and as obtained by my support staff.    Past Medical History:   Diagnosis Date   • ADD (attention  Notification of Unplanned, Urgent, or   Emergency Inpatient Admission   AUTHORIZATION REQUEST   Admitting Facility [place] [address]  Tax ID: [id number]  NPI: [id number]  [facility] of Service: [facility]  Admission Level of Care: Inpatient  Place of Service Code: 21     Attending Physician Information  Attending Name and NPI#: Mario Do Gio [[id number]]  Phone: [phone]     Admission Information  Inpatient Admission Date/Time: 9/13/24  6:17 PM  Discharge Date/Time: 9/14/2024  3:45 PM  Admitting Diagnosis Code/Description:  UTI (urinary tract infection) [N39.0]  Sepsis (HCC) [A41.9]     Utilization Review Contact  Sheila Shepherd, Utilization   Phone: [phone]  Fax: [phone]  Email: [email or web address]  Contact for approvals/pending authorizations, clinical reviews, and discharge.     Physician Advisory Services Contact  Medical Necessity Denial & Uhla-aj-Dhzn Discussion  Phone: [phone]  Fax: [phone]  Email: [email or web address]     DISCHARGE SUPPORT TEAM:  For Patients Discharge Needs & Updates  Phone: [phone] opt. 2 Fax: [phone]  Email: [email or web address]        deficit disorder)    • Depression    • H/O cervical fracture      Past Surgical History:   Procedure Laterality Date   • Liver surgery  2/14/2016   • Repair of ruptured spleen  2/14/2016     Social History     Tobacco Use   • Smoking status: Current Every Day Smoker     Packs/day: 1.00     Types: Cigarettes   • Smokeless tobacco: Never Used   Substance Use Topics   • Alcohol use: No     Alcohol/week: 0.0 standard drinks   • Drug use: No     Comment: denies at this time     Current Outpatient Medications   Medication Sig   • venlafaxine XR (EFFEXOR XR) 150 MG 24 hr capsule Take 2 capsules by mouth every morning.   • amphetamine-dextroamphetamine (ADDERALL XR) 25 MG 24 hr capsule Take 1 capsule by mouth every morning AND 1 capsule daily around lunchtime   • gabapentin (NEURONTIN) 600 MG tablet Take 1 tablet by mouth daily.   • albuterol 108 (90 Base) MCG/ACT inhaler Inhale 2 puffs into the lungs every 4 hours as needed for Shortness of Breath or Wheezing.   • omeprazole (PRILOSEC) 20 MG capsule Take 1 capsule by mouth daily.   • ALPRAZolam ER (XANAX XR) 0.5 MG extended release tablet Take 1 tablet by mouth 2 times daily as needed (anxiety).   • Levonorgestrel (LILETTA, 52 MG,) 18.6 MCG/DAY IUD      No current facility-administered medications for this visit.      ALLERGIES:   Allergen Reactions   • Zanaflex [Tizanidine Hcl] NAUSEA       PHYSICAL EXAM:  Visit Vitals  /60 (BP Location: LUE - Left upper extremity, Patient Position: Sitting, Cuff Size: Regular)   Pulse 68   Temp 98.2 °F (36.8 °C) (Oral)   Resp 16   Ht 5' 7\" (1.702 m)   Wt 75.6 kg   LMP  (LMP Unknown)   BMI 26.10 kg/m²       General:   Alert, cooperative, and in no acute distress.  Skin:  No rash.   Head:  Normocephalic, atraumatic.   Neck:  Trachea is midline. Neck is supple.  Eyes:  Normal conjunctivae and sclerae.   ENT:  Oral mucous membranes moist.  Cardiovascular: Regular rate and rhythm without murmur. No pedal edema noted.  Respiratory:  Respiratory effort is within normal limits, Clear to auscultation.  No wheezes, rales or rhonchi.  Gastrointestinal:  Soft and nontender.  Normal bowel sounds.    Neurologic: Deep tendon reflexes within normal limits. No acute changes with cranial nerve examination.  Psychiatric:  Cooperative. Oriented to time, place and person. Thought process-within normal limits.        Assessment:   1. ADHD (attention deficit hyperactivity disorder), combined type    2. DERRICK (generalized anxiety disorder)    3. Panic disorder    4. Mood disorder (CMS/[facility])    5. Borderline personality disorder (CMS/[facility])          PLAN:  Orders Placed This Encounter   • Drug Screen Complete, Urine   • SERVICE TO BEHAVIORAL HEALTH   • venlafaxine XR (EFFEXOR XR) 150 MG 24 hr capsule   • amphetamine-dextroamphetamine (ADDERALL XR) 25 MG 24 hr capsule         Return in about 4 weeks (around 12/12/2019) for sooner if needed.    We discussed in detail with patient about pathophysiology and all options available for treatment-patient opts to continue treatment regimen discussed today. We also discussed risks versus benefits with medications,other options available,  side effects and symptoms to watch for-patient verbalizes understanding. Patient indicated understanding of the diagnosis and plan of care.    Refilled medications for optimal management and to prevent withdrawal symptoms at this time. Discussed that future refills should come from Behavioral Health-verbalizes understanding.   Discussed urine drug screen details-and patient verbalizes understanding. Ok to proceed with UDS at this time.    25 minutes were spent seeing the patient today, about 20 minutes of time was spent counseling patient about diagnosis and plan of care. All questions were answered for patient.

## 2024-09-17 NOTE — ED PROVIDER NOTES
1. Suprapubic pain    2. Rectal pain    3. Rosado catheter in place      ED Disposition       ED Disposition   Discharge    Condition   Stable    Date/Time   Mon Sep 16, 2024 10:01 PM    Comment   Chaitanya Alexander discharge to home/self care.                   Assessment & Plan       Medical Decision Making  Amount and/or Complexity of Data Reviewed  Labs: ordered. Decision-making details documented in ED Course.    Risk  Prescription drug management.      48-year-old male with PMH of  stricture Rosado catheter in place presents for evaluation of suprapubic pain and rectal pain.  Has been occurring for a while.  Recently admitted and left AMA, is being treated with ciprofloxacin for presumed UTI but urine culture was negative.  On exam patient appears to have a somewhat angry affect but otherwise in NAD.  Rosado catheter is draining clear yellow urine.   exam unremarkable, no evidence currently of phimosis or paraphimosis.  Abdomen soft and generally nontender.  Rectal exam unremarkable.    Repeat lab work considering recent admission for sepsis.  Will give pain medication.  Symptoms likely worsened by bladder spasm, will give oxybutynin.  May have a degree of proctitis, will give Anusol.  Will not repeat CT scan as he had one a few days ago and it was unremarkable for any acute findings.    Patient refuses Anusol.  Lab work is generally unremarkable other than a mild elevation in creatinine, this is treated with IV fluids.  Discussed patient's encouraging blood work, do not suspect any new or worsening infection at this time.  Suspect pain to be due to indwelling Rosado catheter without infection.  Again could have a degree of proctitis but patient does not want the Anusol that would likely help with this.  Recommend that patient follows up soon with urology for further care.  Patient expresses understanding of the condition, treatment plan, follow-up instructions, and return precautions.              ED Course as of  09/17/24 0549   Mon Sep 16, 2024   2002 WBC: 9.61  Improved since prior       Medications   ketorolac (TORADOL) injection 15 mg (15 mg Intravenous Given 9/16/24 1949)   oxybutynin (DITROPAN-XL) 24 hr tablet 5 mg (5 mg Oral Given 9/16/24 1958)   oxyCODONE-acetaminophen (PERCOCET) 5-325 mg per tablet 1 tablet (1 tablet Oral Given 9/16/24 1957)   sodium chloride 0.9 % bolus 1,000 mL (0 mL Intravenous Stopped 9/16/24 2211)   acetaminophen (Ofirmev) injection 1,000 mg (0 mg Intravenous Stopped 9/16/24 2211)       History of Present Illness       HPI    48-year-old male presents for evaluation of suprapubic pain and rectal pain.  This has been occurring for a while.  Patient was just admitted with evidence of sepsis likely from a  source, started on ciprofloxacin, ended up leaving [place] due to family obligations.  States he has been taking his medications as directed.  His prior urine culture from 4 days ago came back as no growth so unlikely true UTI, however states that his symptoms did improve a bit with the Cipro.  States he feels pain in his penis around where the stricture is especially when he is try to make a bowel movement or urinate.  States he feels a burning sensation inside of his rectum especially when he is trying to make a bowel movement.      Review of Systems   Constitutional:  Negative for chills and fever.   HENT:  Negative for ear pain and sore throat.    Eyes:  Negative for pain and visual disturbance.   Respiratory:  Negative for cough and shortness of breath.    Cardiovascular:  Negative for chest pain and palpitations.   Gastrointestinal:  Positive for abdominal pain and rectal pain. Negative for vomiting.   Genitourinary:  Negative for dysuria and hematuria.   Musculoskeletal:  Negative for arthralgias and back pain.   Skin:  Negative for color change and rash.   Neurological:  Negative for seizures and syncope.   All other systems reviewed and are negative.          Objective     ED Triage  Vitals   Temperature Pulse Blood Pressure Respirations SpO2 Patient Position - Orthostatic VS   09/16/24 1743 09/16/24 1743 09/16/24 1743 09/16/24 1743 09/16/24 1743 09/16/24 1743   98.7 °F (37.1 °C) 97 138/85 16 97 % Sitting      Temp src Heart Rate Source BP Location FiO2 (%) Pain Score    -- 09/16/24 1835 09/16/24 1743 -- 09/16/24 1949     Monitor Right arm  10 - Worst Possible Pain        Physical Exam  Vitals and nursing note reviewed.   Constitutional:       General: He is not in acute distress.     Appearance: He is well-developed.   HENT:      Head: Normocephalic and atraumatic.   Eyes:      Conjunctiva/sclera: Conjunctivae normal.   Cardiovascular:      Rate and Rhythm: Normal rate and regular rhythm.      Heart sounds: No murmur heard.  Pulmonary:      Effort: Pulmonary effort is normal. No respiratory distress.      Breath sounds: Normal breath sounds.   Abdominal:      Palpations: Abdomen is soft.      Tenderness: There is no abdominal tenderness.   Genitourinary:     Penis: No phimosis or paraphimosis.       Rectum: No tenderness or external hemorrhoid.      Comments: Patient refused internal exam  Musculoskeletal:         General: No swelling.      Cervical back: Neck supple.   Skin:     General: Skin is warm and dry.      Capillary Refill: Capillary refill takes less than 2 seconds.   Neurological:      Mental Status: He is alert.   Psychiatric:         Mood and Affect: Mood normal. Affect is angry.         Labs Reviewed   COMPREHENSIVE METABOLIC PANEL - Abnormal       Result Value    Sodium 139      Potassium 4.1      Chloride 110 (*)     CO2 25      ANION GAP 4      BUN 21      Creatinine 1.38 (*)     Glucose 89      Calcium 8.5      AST 13      ALT 15      Alkaline Phosphatase 65      Total Protein 6.1 (*)     Albumin 3.9      Total Bilirubin 0.26      eGFR 59      Narrative:     [employer] guidelines for Chronic Kidney Disease (CKD):     Stage 1 with normal or high GFR (GFR  > 90 mL/min/1.73 square meters)    Stage 2 Mild CKD (GFR = 60-89 mL/min/1.73 square meters)    Stage 3A Moderate CKD (GFR = 45-59 mL/min/1.73 square meters)    Stage 3B Moderate CKD (GFR = 30-44 mL/min/1.73 square meters)    Stage 4 Severe CKD (GFR = 15-29 mL/min/1.73 square meters)    Stage 5 End Stage CKD (GFR <15 mL/min/1.73 square meters)  Note: GFR calculation is accurate only with a steady state creatinine   LACTIC ACID, PLASMA (W/REFLEX IF RESULT > 2.0) - Normal    LACTIC ACID 0.6      Narrative:     Result may be elevated if tourniquet was used during collection.   PROCALCITONIN TEST - Normal    Procalcitonin 0.09     PROTIME-INR - Normal    Protime 13.7      INR 1.03      Narrative:     INR Therapeutic Range    Indication                                             INR Range      Atrial Fibrillation                                               2.0-3.0  Hypercoagulable State                                    2.0.2.3  Left Ventricular Asist Device                            2.0-3.0  Mechanical Heart Valve                                  -    Aortic(with afib, MI, embolism, HF, LA enlargement,    and/or coagulopathy)                                     2.0-3.0 (2.5-3.5)     Mitral                                                             2.5-3.5  Prosthetic/Bioprosthetic Heart Valve               2.0-3.0  Venous thromboembolism (VTE: VT, PE        2.0-3.0   APTT - Normal    PTT 29     CBC AND DIFFERENTIAL    WBC 9.61      RBC 4.48      Hemoglobin 13.4      Hematocrit 40.2      MCV 90      MCH 29.9      MCHC 33.3      RDW 12.6      MPV 10.1      Platelets 264      nRBC 0      Segmented % 72      Immature Grans % 0      Lymphocytes % 18      Monocytes % 7      Eosinophils Relative 3      Basophils Relative 0      Absolute Neutrophils 6.86      Absolute Immature Grans 0.03      Absolute Lymphocytes 1.69      Absolute Monocytes 0.69      Eosinophils Absolute 0.30      Basophils Absolute 0.04       No orders  to display       Procedures       Roscoe Marquez PA-C  09/17/24 0573

## 2024-09-17 NOTE — DISCHARGE INSTRUCTIONS
Your workup tonight was very encouraging.  The only remarkable finding was a mild change in your kidney function, which was treated by administering IV fluids.  The rest of the lab work was unremarkable and there were no signs that would indicate a worsening infection.  You can try taking the oxybutynin 1 tablet by mouth daily in the morning to help with bladder spasms.  Recommend that you follow-up with urology soon to continue management of your symptoms.

## 2024-09-19 LAB
BACTERIA BLD CULT: NORMAL
BACTERIA BLD CULT: NORMAL

## 2024-10-13 PROBLEM — A41.9 SEPSIS (HCC): Status: RESOLVED | Noted: 2024-09-13 | Resolved: 2024-10-13

## 2024-10-13 PROBLEM — N39.0 UTI (URINARY TRACT INFECTION): Status: RESOLVED | Noted: 2024-09-13 | Resolved: 2024-10-13

## 2024-11-18 ENCOUNTER — TELEPHONE (OUTPATIENT)
Age: 49
End: 2024-11-18

## 2024-11-18 NOTE — TELEPHONE ENCOUNTER
Patient was referred to see Urology but he went to the [address] for the office on [address] [place] [address] for Urology.  He was referred to see Urology by his pcp.  The young lady on the phone was trying to help him.  He stopped by an office at the [facility] for GI.  Information was provided for the two [place] offices for Urology and patient will call back to schedule new patient appointment.

## 2025-06-02 ENCOUNTER — HOSPITAL ENCOUNTER (EMERGENCY)
Facility: HOSPITAL | Age: 50
Discharge: HOME | End: 2025-06-02
Attending: EMERGENCY MEDICINE | Admitting: EMERGENCY MEDICINE
Payer: COMMERCIAL

## 2025-06-02 ENCOUNTER — APPOINTMENT (EMERGENCY)
Dept: CT IMAGING | Facility: HOSPITAL | Age: 50
End: 2025-06-02
Payer: COMMERCIAL

## 2025-06-02 VITALS
OXYGEN SATURATION: 99 % | SYSTOLIC BLOOD PRESSURE: 118 MMHG | BODY MASS INDEX: 24.62 KG/M2 | HEART RATE: 64 BPM | TEMPERATURE: 98.1 F | RESPIRATION RATE: 19 BRPM | DIASTOLIC BLOOD PRESSURE: 67 MMHG | WEIGHT: 152.56 LBS

## 2025-06-02 DIAGNOSIS — R42 DIZZINESS: ICD-10-CM

## 2025-06-02 DIAGNOSIS — R51.9 HEADACHE: Primary | ICD-10-CM

## 2025-06-02 LAB
ALBUMIN SERPL BCG-MCNC: 4.4 G/DL (ref 3.5–5)
ALP SERPL-CCNC: 64 U/L (ref 34–104)
ALT SERPL W P-5'-P-CCNC: 12 U/L (ref 7–52)
ANION GAP SERPL CALCULATED.3IONS-SCNC: 4 MMOL/L (ref 4–13)
APTT PPP: 28 SECONDS (ref 23–34)
AST SERPL W P-5'-P-CCNC: 15 U/L (ref 13–39)
ATRIAL RATE: 60 BPM
BASOPHILS # BLD AUTO: 0.03 THOUSANDS/ÂΜL (ref 0–0.1)
BASOPHILS NFR BLD AUTO: 0 % (ref 0–1)
BILIRUB SERPL-MCNC: 0.41 MG/DL (ref 0.2–1)
BUN SERPL-MCNC: 14 MG/DL (ref 5–25)
CALCIUM SERPL-MCNC: 9.3 MG/DL (ref 8.4–10.2)
CARDIAC TROPONIN I PNL SERPL HS: <2 NG/L (ref ?–50)
CHLORIDE SERPL-SCNC: 109 MMOL/L (ref 96–108)
CO2 SERPL-SCNC: 26 MMOL/L (ref 21–32)
CREAT SERPL-MCNC: 0.99 MG/DL (ref 0.6–1.3)
EOSINOPHIL # BLD AUTO: 0.16 THOUSAND/ÂΜL (ref 0–0.61)
EOSINOPHIL NFR BLD AUTO: 2 % (ref 0–6)
ERYTHROCYTE [DISTWIDTH] IN BLOOD BY AUTOMATED COUNT: 13.2 % (ref 11.6–15.1)
FLUAV AG UPPER RESP QL IA.RAPID: NEGATIVE
FLUBV AG UPPER RESP QL IA.RAPID: NEGATIVE
GFR SERPL CREATININE-BSD FRML MDRD: 87 ML/MIN/1.73SQ M
GLUCOSE SERPL-MCNC: 106 MG/DL (ref 65–140)
HCT VFR BLD AUTO: 46 % (ref 36.5–49.3)
HGB BLD-MCNC: 15.8 G/DL (ref 12–17)
IMM GRANULOCYTES # BLD AUTO: 0.01 THOUSAND/UL (ref 0–0.2)
IMM GRANULOCYTES NFR BLD AUTO: 0 % (ref 0–2)
INR PPP: 0.99 (ref 0.85–1.19)
LIPASE SERPL-CCNC: 23 U/L (ref 11–82)
LYMPHOCYTES # BLD AUTO: 1.13 THOUSANDS/ÂΜL (ref 0.6–4.47)
LYMPHOCYTES NFR BLD AUTO: 13 % (ref 14–44)
MCH RBC QN AUTO: 30.4 PG (ref 26.8–34.3)
MCHC RBC AUTO-ENTMCNC: 34.3 G/DL (ref 31.4–37.4)
MCV RBC AUTO: 89 FL (ref 82–98)
MONOCYTES # BLD AUTO: 0.45 THOUSAND/ÂΜL (ref 0.17–1.22)
MONOCYTES NFR BLD AUTO: 5 % (ref 4–12)
NEUTROPHILS # BLD AUTO: 6.94 THOUSANDS/ÂΜL (ref 1.85–7.62)
NEUTS SEG NFR BLD AUTO: 80 % (ref 43–75)
NRBC BLD AUTO-RTO: 0 /100 WBCS
P AXIS: 58 DEGREES
PLATELET # BLD AUTO: 275 THOUSANDS/UL (ref 149–390)
PMV BLD AUTO: 9.4 FL (ref 8.9–12.7)
POTASSIUM SERPL-SCNC: 4 MMOL/L (ref 3.5–5.3)
PR INTERVAL: 124 MS
PROT SERPL-MCNC: 6.8 G/DL (ref 6.4–8.4)
PROTHROMBIN TIME: 13.3 SECONDS (ref 12.3–15)
QRS AXIS: 49 DEGREES
QRSD INTERVAL: 88 MS
QT INTERVAL: 392 MS
QTC INTERVAL: 392 MS
RBC # BLD AUTO: 5.2 MILLION/UL (ref 3.88–5.62)
SARS-COV+SARS-COV-2 AG RESP QL IA.RAPID: NEGATIVE
SODIUM SERPL-SCNC: 139 MMOL/L (ref 135–147)
T WAVE AXIS: 22 DEGREES
VENTRICULAR RATE: 60 BPM
WBC # BLD AUTO: 8.72 THOUSAND/UL (ref 4.31–10.16)

## 2025-06-02 PROCEDURE — 96367 TX/PROPH/DG ADDL SEQ IV INF: CPT

## 2025-06-02 PROCEDURE — 87804 INFLUENZA ASSAY W/OPTIC: CPT

## 2025-06-02 PROCEDURE — 36415 COLL VENOUS BLD VENIPUNCTURE: CPT

## 2025-06-02 PROCEDURE — 96375 TX/PRO/DX INJ NEW DRUG ADDON: CPT

## 2025-06-02 PROCEDURE — 84484 ASSAY OF TROPONIN QUANT: CPT

## 2025-06-02 PROCEDURE — 85730 THROMBOPLASTIN TIME PARTIAL: CPT

## 2025-06-02 PROCEDURE — 70498 CT ANGIOGRAPHY NECK: CPT

## 2025-06-02 PROCEDURE — 99285 EMERGENCY DEPT VISIT HI MDM: CPT

## 2025-06-02 PROCEDURE — 93005 ELECTROCARDIOGRAM TRACING: CPT

## 2025-06-02 PROCEDURE — 70496 CT ANGIOGRAPHY HEAD: CPT

## 2025-06-02 PROCEDURE — 96366 THER/PROPH/DIAG IV INF ADDON: CPT

## 2025-06-02 PROCEDURE — 96361 HYDRATE IV INFUSION ADD-ON: CPT

## 2025-06-02 PROCEDURE — 93010 ELECTROCARDIOGRAM REPORT: CPT | Performed by: INTERNAL MEDICINE

## 2025-06-02 PROCEDURE — 83690 ASSAY OF LIPASE: CPT

## 2025-06-02 PROCEDURE — 96365 THER/PROPH/DIAG IV INF INIT: CPT

## 2025-06-02 PROCEDURE — 87811 SARS-COV-2 COVID19 W/OPTIC: CPT

## 2025-06-02 PROCEDURE — 80053 COMPREHEN METABOLIC PANEL: CPT

## 2025-06-02 PROCEDURE — 85025 COMPLETE CBC W/AUTO DIFF WBC: CPT

## 2025-06-02 PROCEDURE — 85610 PROTHROMBIN TIME: CPT

## 2025-06-02 RX ORDER — ONDANSETRON 2 MG/ML
4 INJECTION INTRAMUSCULAR; INTRAVENOUS ONCE
Status: COMPLETED | OUTPATIENT
Start: 2025-06-02 | End: 2025-06-02

## 2025-06-02 RX ORDER — ACETAMINOPHEN 10 MG/ML
1000 INJECTION, SOLUTION INTRAVENOUS ONCE
Status: COMPLETED | OUTPATIENT
Start: 2025-06-02 | End: 2025-06-02

## 2025-06-02 RX ORDER — METOCLOPRAMIDE HYDROCHLORIDE 5 MG/ML
10 INJECTION INTRAMUSCULAR; INTRAVENOUS ONCE
Status: COMPLETED | OUTPATIENT
Start: 2025-06-02 | End: 2025-06-02

## 2025-06-02 RX ORDER — KETOROLAC TROMETHAMINE 30 MG/ML
15 INJECTION, SOLUTION INTRAMUSCULAR; INTRAVENOUS ONCE
Status: COMPLETED | OUTPATIENT
Start: 2025-06-02 | End: 2025-06-02

## 2025-06-02 RX ORDER — MAGNESIUM SULFATE HEPTAHYDRATE 40 MG/ML
2 INJECTION, SOLUTION INTRAVENOUS ONCE
Status: COMPLETED | OUTPATIENT
Start: 2025-06-02 | End: 2025-06-02

## 2025-06-02 RX ORDER — MECLIZINE HYDROCHLORIDE 25 MG/1
25 TABLET ORAL ONCE
Status: COMPLETED | OUTPATIENT
Start: 2025-06-02 | End: 2025-06-02

## 2025-06-02 RX ORDER — DIPHENHYDRAMINE HYDROCHLORIDE 50 MG/ML
25 INJECTION, SOLUTION INTRAMUSCULAR; INTRAVENOUS ONCE
Status: COMPLETED | OUTPATIENT
Start: 2025-06-02 | End: 2025-06-02

## 2025-06-02 RX ADMIN — MAGNESIUM SULFATE HEPTAHYDRATE 2 G: 40 INJECTION, SOLUTION INTRAVENOUS at 09:58

## 2025-06-02 RX ADMIN — SODIUM CHLORIDE 500 ML: 0.9 INJECTION, SOLUTION INTRAVENOUS at 10:11

## 2025-06-02 RX ADMIN — DIPHENHYDRAMINE HYDROCHLORIDE 25 MG: 50 INJECTION, SOLUTION INTRAMUSCULAR; INTRAVENOUS at 09:51

## 2025-06-02 RX ADMIN — METOCLOPRAMIDE 10 MG: 5 INJECTION, SOLUTION INTRAMUSCULAR; INTRAVENOUS at 09:54

## 2025-06-02 RX ADMIN — IOHEXOL 85 ML: 350 INJECTION, SOLUTION INTRAVENOUS at 08:55

## 2025-06-02 RX ADMIN — MECLIZINE HYDROCHLORIDE 25 MG: 25 TABLET ORAL at 08:08

## 2025-06-02 RX ADMIN — ACETAMINOPHEN 1000 MG: 10 INJECTION INTRAVENOUS at 08:04

## 2025-06-02 RX ADMIN — ONDANSETRON 4 MG: 2 INJECTION INTRAMUSCULAR; INTRAVENOUS at 08:05

## 2025-06-02 RX ADMIN — SODIUM CHLORIDE 1000 ML: 0.9 INJECTION, SOLUTION INTRAVENOUS at 08:02

## 2025-06-02 RX ADMIN — KETOROLAC TROMETHAMINE 15 MG: 30 INJECTION, SOLUTION INTRAMUSCULAR; INTRAVENOUS at 09:52

## 2025-06-02 NOTE — ED PROVIDER NOTES
Time reflects when diagnosis was documented in both MDM as applicable and the Disposition within this note       Time User Action Codes Description Comment    6/2/2025 11:49 AM Juanjose Choi Add [R51.9] Headache     6/2/2025 11:49 AM Juanjose Choi [R42] Dizziness           ED Disposition       ED Disposition   Discharge    Condition   Stable    Date/Time   Mon Jun 2, 2025 11:49 AM    Comment   Chaitanya Alexander discharge to home/self care.                   Assessment & Plan       Medical Decision Making  49-year-old male presents to the ED for evaluation of dizziness.  Reports positional nature, intermittent sensation of vertigo, does also complain of lightheadedness.  Also complains of nonradiating posterior headache, insidious onset no thunderclap, no falls or trauma.  Patient afebrile in the ED with normal vital signs, GCS of 15, ambulatory in the ED with a steady gait, no focal neurological deficits on exam.  CTA head and neck acutely unremarkable, labs acutely unremarkable.  Patient reported improvement in symptoms status post ED medications, denied headache or dizziness at time of discharge.  Patient advised close follow-up with his PCP as soon as possible for further evaluation and management.  Patient reports he does have a primary care provider that he does follow with.  ED return precautions discussed with patient.  Patient verbalized understanding and agreement with plan    Amount and/or Complexity of Data Reviewed  Labs: ordered.  Radiology: ordered. Decision-making details documented in ED Course.    Risk  Prescription drug management.        ED Course as of 06/02/25 1221   Mon Jun 02, 2025   0931 CTA head and neck with and without contrast  IMPRESSION:  CT Brain: No acute intracranial pathology.     CT Angiography: No hemodynamically significant stenosis, large vessel occlusion, dissection, or aneurysm involving the head or neck arterial vasculature.        1139 Patient reports improvement in symptoms at  this time.  He denies dizziness or signs of headache.  Reports he is comfortable with discharge, advised close PCP follow-up with ED return precautions discussed.       Medications   sodium chloride 0.9 % bolus 1,000 mL (0 mL Intravenous Stopped 6/2/25 1104)   ondansetron (ZOFRAN) injection 4 mg (4 mg Intravenous Given 6/2/25 0805)   acetaminophen (Ofirmev) injection 1,000 mg (0 mg Intravenous Stopped 6/2/25 0943)   meclizine (ANTIVERT) tablet 25 mg (25 mg Oral Given 6/2/25 0808)   iohexol (OMNIPAQUE) 350 MG/ML injection (SINGLE-DOSE) 85 mL (85 mL Intravenous Given 6/2/25 0855)   ketorolac (TORADOL) injection 15 mg (15 mg Intravenous Given 6/2/25 0952)   metoclopramide (REGLAN) injection 10 mg (10 mg Intravenous Given 6/2/25 0954)   diphenhydrAMINE (BENADRYL) injection 25 mg (25 mg Intravenous Given 6/2/25 0951)   magnesium sulfate 2 g/50 mL IVPB (premix) 2 g (0 g Intravenous Stopped 6/2/25 1104)   sodium chloride 0.9 % bolus 500 mL (0 mL Intravenous Stopped 6/2/25 1143)       ED Risk Strat Scores                    No data recorded        SBIRT 20yo+      Flowsheet Row Most Recent Value   Initial Alcohol Screen: US AUDIT-C     1. How often do you have a drink containing alcohol? 0 Filed at: 06/02/2025 1146   2. How many drinks containing alcohol do you have on a typical day you are drinking?  0 Filed at: 06/02/2025 1146   3a. Male UNDER 65: How often do you have five or more drinks on one occasion? 0 Filed at: 06/02/2025 1146   3b. FEMALE Any Age, or MALE 65+: How often do you have 4 or more drinks on one occassion? 0 Filed at: 06/02/2025 1146   Audit-C Score 0 Filed at: 06/02/2025 1146   KALEB: How many times in the past year have you...    Used an illegal drug or used a prescription medication for non-medical reasons? Never Filed at: 06/02/2025 1146                            History of Present Illness       Chief Complaint   Patient presents with    Dizziness     Patient reporting dizziness starting last night  "and a \"pain in the back of his head\". + vomiting.        Past Medical History[1]   Past Surgical History[2]   Family History[3]   Social History[4]   E-Cigarette/Vaping    E-Cigarette Use Former User       E-Cigarette/Vaping Substances      I have reviewed and agree with the history as documented.     This is a 49 YOM with depression, anxiety who presents to the the ED for evaluation of dizziness.  Patient reports symptoms began last night.  He reports dizziness, describes it both as lightheadedness as well as sensation of vertigo.  States intermittent, worsened with standing and positional changes.  Does report intermittent blurred vision, vision is appear grossly intact during initial ED evaluation.  Does report nausea since last night, reports he did vomit this morning, nonbloody emesis.  He reports posterior headache since last night, insidious onset, non-radiating.  Denies any falls or injury, is not on blood thinners.  He denies any recent fevers, neck pain, chest pain, SOB, abdominal pain, diarrhea, dysuria.  Denies any recent prolonged travel, denies any pain or swelling in either leg.      Dizziness  Associated symptoms: headaches (Non-radiaiting posterior headache), nausea and vomiting (Non-bloody)    Associated symptoms: no blood in stool, no chest pain, no diarrhea, no palpitations and no shortness of breath        Review of Systems   Constitutional:  Negative for chills and fever.   Eyes:  Positive for photophobia and visual disturbance.   Respiratory:  Negative for cough and shortness of breath.    Cardiovascular:  Negative for chest pain and palpitations.   Gastrointestinal:  Positive for nausea and vomiting (Non-bloody). Negative for abdominal pain, blood in stool and diarrhea.   Genitourinary:  Negative for difficulty urinating, dysuria, flank pain and hematuria.   Musculoskeletal:  Negative for neck pain and neck stiffness.   Neurological:  Positive for dizziness, light-headedness and headaches " (Non-radiaiting posterior headache).           Objective       ED Triage Vitals   Temperature Pulse Blood Pressure Respirations SpO2 Patient Position - Orthostatic VS   06/02/25 0736 06/02/25 0736 06/02/25 0736 06/02/25 0736 06/02/25 0736 06/02/25 1143   98.1 °F (36.7 °C) 67 142/79 18 98 % Lying      Temp src Heart Rate Source BP Location FiO2 (%) Pain Score    -- -- 06/02/25 1143 -- 06/02/25 0807      Left arm  8      Vitals      Date and Time Temp Pulse SpO2 Resp BP Pain Score FACES Pain Rating User   06/02/25 1143 -- 64 99 % 19 118/67 -- --    06/02/25 0952 -- -- -- -- -- 7 -- [facility]   06/02/25 0807 -- -- -- -- -- 8 -- [facility]   06/02/25 0736 98.1 °F (36.7 °C) 67 98 % 18 142/79 -- --             Physical Exam  Vitals and nursing note reviewed.   Constitutional:       General: He is not in acute distress.     Appearance: He is well-developed. He is not diaphoretic.   HENT:      Head: Normocephalic and atraumatic.      Mouth/Throat:      Mouth: Mucous membranes are moist.      Pharynx: Oropharynx is clear.     Eyes:      General: Vision grossly intact. No visual field deficit.     Extraocular Movements: Extraocular movements intact.      Right eye: Normal extraocular motion and no nystagmus.      Left eye: Normal extraocular motion and no nystagmus.      Conjunctiva/sclera: Conjunctivae normal.      Pupils: Pupils are equal, round, and reactive to light.       Cardiovascular:      Rate and Rhythm: Normal rate and regular rhythm.      Heart sounds: No murmur heard.  Pulmonary:      Effort: Pulmonary effort is normal. No tachypnea or respiratory distress.      Breath sounds: Normal breath sounds. No decreased breath sounds, wheezing, rhonchi or rales.   Abdominal:      General: Abdomen is flat.      Palpations: Abdomen is soft.      Tenderness: There is no abdominal tenderness. There is no right CVA tenderness or left CVA tenderness.     Musculoskeletal:         General: No swelling.      Cervical back: Normal range of  motion and neck supple. No rigidity.      Right lower leg: No edema.      Left lower leg: No edema.     Skin:     General: Skin is warm and dry.      Capillary Refill: Capillary refill takes less than 2 seconds.     Neurological:      General: No focal deficit present.      Mental Status: He is alert and oriented to person, place, and time.      GCS: GCS eye subscore is 4. GCS verbal subscore is 5. GCS motor subscore is 6.      Cranial Nerves: No cranial nerve deficit, dysarthria or facial asymmetry.      Sensory: Sensation is intact. No sensory deficit.      Motor: Motor function is intact. No weakness.      Coordination: Coordination is intact.      Gait: Gait is intact. Gait (Ambulatory in the ED) normal.     Psychiatric:         Mood and Affect: Mood normal.         Results Reviewed       Procedure Component Value Units Date/Time    FLU/COVID Rapid Antigen (30 min. TAT) - Preferred screening test in ED [[id number]]  (Normal) Collected: 06/02/25 0809    Lab Status: Final result Specimen: Nares from Nose Updated: 06/02/25 0845     SARS COV Rapid Antigen Negative     Influenza A Rapid Antigen Negative     Influenza B Rapid Antigen Negative    Narrative:      This test has been performed using the Quidel Rosy 2 FLU+SARS Antigen test under the Emergency Use Authorization (EUA). This test has been validated by the  and verified by the performing laboratory. The Rosy uses lateral flow immunofluorescent sandwich assay to detect SARS-COV, Influenza A and Influenza B Antigen.     The Quidel Rosy 2 SARS Antigen test does not differentiate between SARS-CoV and SARS-CoV-2.     Negative results are presumptive and may be confirmed with a molecular assay, if necessary, for patient management. Negative results do not rule out SARS-CoV-2 or influenza infection and should not be used as the sole basis for treatment or patient management decisions. A negative test result may occur if the level of antigen in a sample  is below the limit of detection of this test.     Positive results are indicative of the presence of viral antigens, but do not rule out bacterial infection or co-infection with other viruses.     All test results should be used as an adjunct to clinical observations and other information available to the provider.    FOR PEDIATRIC PATIENTS - copy/paste COVID Guidelines URL to browser: [email or web address]    HS Troponin 0hr (reflex protocol) [[id number]]  (Normal) Collected: 06/02/25 0809    Lab Status: Final result Specimen: Blood from Arm, Right Updated: 06/02/25 0838     hs TnI 0hr <2 ng/L     Comprehensive metabolic panel [[id number]]  (Abnormal) Collected: 06/02/25 0809    Lab Status: Final result Specimen: Blood from Arm, Right Updated: 06/02/25 0838     Sodium 139 mmol/L      Potassium 4.0 mmol/L      Chloride 109 mmol/L      CO2 26 mmol/L      ANION GAP 4 mmol/L      BUN 14 mg/dL      Creatinine 0.99 mg/dL      Glucose 106 mg/dL      Calcium 9.3 mg/dL      AST 15 U/L      ALT 12 U/L      Alkaline Phosphatase 64 U/L      Total Protein 6.8 g/dL      Albumin 4.4 g/dL      Total Bilirubin 0.41 mg/dL      eGFR 87 ml/min/1.73sq m     Narrative:      National Kid[employer] guidelines for Chronic Kidney Disease (CKD):     Stage 1 with normal or high GFR (GFR > 90 mL/min/1.73 square meters)    Stage 2 Mild CKD (GFR = 60-89 mL/min/1.73 square meters)    Stage 3A Moderate CKD (GFR = 45-59 mL/min/1.73 square meters)    Stage 3B Moderate CKD (GFR = 30-44 mL/min/1.73 square meters)    Stage 4 Severe CKD (GFR = 15-29 mL/min/1.73 square meters)    Stage 5 End Stage CKD (GFR <15 mL/min/1.73 square meters)  Note: GFR calculation is accurate only with a steady state creatinine    Lipase [[id number]]  (Normal) Collected: 06/02/25 0809    Lab Status: Final result Specimen: Blood from Arm, Right Updated: 06/02/25 0838     Lipase 23 u/L     Protime-INR [[id number]]   (Normal) Collected: 06/02/25 0809    Lab Status: Final result Specimen: Blood from Arm, Right Updated: 06/02/25 0829     Protime 13.3 seconds      INR 0.99    Narrative:      INR Therapeutic Range    Indication                                             INR Range      Atrial Fibrillation                                               2.0-3.0  Hypercoagulable State                                    2.0.2.3  Left Ventricular Asist Device                            2.0-3.0  Mechanical Heart Valve                                  -    Aortic(with afib, MI, embolism, HF, LA enlargement,    and/or coagulopathy)                                     2.0-3.0 (2.5-3.5)     Mitral                                                             2.5-3.5  Prosthetic/Bioprosthetic Heart Valve               2.0-3.0  Venous thromboembolism (VTE: VT, PE        2.0-3.0    APTT [[id number]]  (Normal) Collected: 06/02/25 0809    Lab Status: Final result Specimen: Blood from Arm, Right Updated: 06/02/25 0829     PTT 28 seconds     CBC and differential [[id number]]  (Abnormal) Collected: 06/02/25 0809    Lab Status: Final result Specimen: Blood from Arm, Right Updated: 06/02/25 0817     WBC 8.72 Thousand/uL      RBC 5.20 Million/uL      Hemoglobin 15.8 g/dL      Hematocrit 46.0 %      MCV 89 fL      MCH 30.4 pg      MCHC 34.3 g/dL      RDW 13.2 %      MPV 9.4 fL      Platelets 275 Thousands/uL      nRBC 0 /100 WBCs      Segmented % 80 %      Immature Grans % 0 %      Lymphocytes % 13 %      Monocytes % 5 %      Eosinophils Relative 2 %      Basophils Relative 0 %      Absolute Neutrophils 6.94 Thousands/µL      Absolute Immature Grans 0.01 Thousand/uL      Absolute Lymphocytes 1.13 Thousands/µL      Absolute Monocytes 0.45 Thousand/µL      Eosinophils Absolute 0.16 Thousand/µL      Basophils Absolute 0.03 Thousands/µL             CTA head and neck with and without contrast   Final Interpretation by Neptali Stern MD (06/02 0926)   CT Brain:  No acute intracranial pathology.      CT Angiography: No hemodynamically significant stenosis, large vessel occlusion, dissection, or aneurysm involving the head or neck arterial vasculature.         Workstation performed: [id number]             ECG 12 Lead Documentation Only    Date/Time: 2025 8:45 AM    Performed by: Juanjose Choi PA-C  Authorized by: Juanjose Choi PA-C    Rate:     ECG rate:  60    ECG rate assessment: normal    Rhythm:     Rhythm: sinus rhythm    Ectopy:     Ectopy: none    QRS:     QRS axis:  Normal  Conduction:     Conduction: normal    ST segments:     ST segments:  Normal  T waves:     T waves: normal        ED Medication and Procedure Management   Prior to Admission Medications   Prescriptions Last Dose Informant Patient Reported? Taking?   acetaminophen (TYLENOL) 500 mg tablet Not Taking  No No   Sig: Take 1 tablet (500 mg total) by mouth every 6 (six) hours as needed for moderate pain   Patient not taking: Reported on 2025   fluticasone (FLONASE) 50 mcg/act nasal spray Not Taking  No No   Si spray into each nostril daily   Patient not taking: Reported on 2025   gabapentin (NEURONTIN) 800 mg tablet 2025  Yes Yes   Sig: Take 300 mg by mouth as needed   oxybutynin (DITROPAN-XL) 5 mg 24 hr tablet Not Taking  No No   Sig: Take 1 tablet (5 mg total) by mouth daily   Patient not taking: Reported on 2025      Facility-Administered Medications: None     Discharge Medication List as of 2025 11:50 AM        CONTINUE these medications which have NOT CHANGED    Details   gabapentin (NEURONTIN) 800 mg tablet Take 300 mg by mouth as needed, Starting Mon 7/10/2023, Historical Med      acetaminophen (TYLENOL) 500 mg tablet Take 1 tablet (500 mg total) by mouth every 6 (six) hours as needed for moderate pain, Starting Wed 2023, Normal      fluticasone (FLONASE) 50 mcg/act nasal spray 1 spray into each nostril daily, Starting 2023, Print      oxybutynin (DITROPAN-XL) 5  mg 24 hr tablet Take 1 tablet (5 mg total) by mouth daily, Starting Mon 9/16/2024, Normal           No discharge procedures on file.  ED SEPSIS DOCUMENTATION   Time reflects when diagnosis was documented in both MDM as applicable and the Disposition within this note       Time User Action Codes Description Comment    6/2/2025 11:49 AM Juanjose Choi [R51.9] Headache     6/2/2025 11:49 AM Juanjose Choi [R42] Dizziness                      [1]   Past Medical History:  Diagnosis Date    Anxiety     Depression    [2]   Past Surgical History:  Procedure Laterality Date    EAR SURGERY Right     EYE SURGERY Right     PENIS SURGERY     [3] No family history on file.  [4]   Social History  Tobacco Use    Smoking status: Former     Types: Cigarettes    Smokeless tobacco: Never   Vaping Use    Vaping status: Former   Substance Use Topics    Alcohol use: Never    Drug use: Yes     Types: Marijuana        Juanjose Choi PA-C  06/02/25 1221